# Patient Record
Sex: FEMALE | Race: BLACK OR AFRICAN AMERICAN | URBAN - METROPOLITAN AREA
[De-identification: names, ages, dates, MRNs, and addresses within clinical notes are randomized per-mention and may not be internally consistent; named-entity substitution may affect disease eponyms.]

---

## 2018-04-03 ENCOUNTER — EMERGENCY (EMERGENCY)
Facility: HOSPITAL | Age: 43
LOS: 0 days | Discharge: HOME | End: 2018-04-03
Attending: EMERGENCY MEDICINE

## 2018-04-03 VITALS
OXYGEN SATURATION: 100 % | HEART RATE: 75 BPM | RESPIRATION RATE: 18 BRPM | DIASTOLIC BLOOD PRESSURE: 65 MMHG | SYSTOLIC BLOOD PRESSURE: 113 MMHG | TEMPERATURE: 97 F

## 2018-04-03 DIAGNOSIS — R59.0 LOCALIZED ENLARGED LYMPH NODES: ICD-10-CM

## 2018-04-03 DIAGNOSIS — J02.9 ACUTE PHARYNGITIS, UNSPECIFIED: ICD-10-CM

## 2018-04-03 DIAGNOSIS — R07.0 PAIN IN THROAT: ICD-10-CM

## 2018-04-03 RX ORDER — DEXAMETHASONE 0.5 MG/5ML
10 ELIXIR ORAL ONCE
Qty: 0 | Refills: 0 | Status: COMPLETED | OUTPATIENT
Start: 2018-04-03 | End: 2018-04-03

## 2018-04-03 RX ORDER — IBUPROFEN 200 MG
600 TABLET ORAL ONCE
Qty: 0 | Refills: 0 | Status: COMPLETED | OUTPATIENT
Start: 2018-04-03 | End: 2018-04-03

## 2018-04-03 RX ADMIN — Medication 10 MILLIGRAM(S): at 12:26

## 2018-04-03 RX ADMIN — Medication 600 MILLIGRAM(S): at 12:27

## 2018-04-03 NOTE — ED PROVIDER NOTE - PHYSICAL EXAMINATION
CONSTITUTIONAL: Well-developed; well-nourished; in no acute distress.   ENT: +pharyngeal erythema, no exudates, no tonsillar enlargement; No nasal discharge; airway clear. TM's clear b/l  NECK: +lymphadenopathy on right side  CARD: S1, S2 normal; no murmurs, gallops, or rubs. Regular rate and rhythm.   RESP: No wheezes, rales or rhonchi.  NEURO: Alert, oriented, grossly unremarkable  PSYCH: Cooperative, appropriate.

## 2018-04-03 NOTE — ED PROVIDER NOTE - OBJECTIVE STATEMENT
43 y/o female with no pmhx presents with sore throat for 3 days. Patient states she gets this every year, associated with dysphagia to solids, denies fevers/chills, cough, n/v/d, abdominal pain, voice changes. Denies taking anything for the pain.

## 2018-04-03 NOTE — ED PROVIDER NOTE - NS ED ROS FT
Constitutional: See HPI.  ENMT: +throat pain; No hearing changes, pain, discharge or infections.   Cardiac: No chest pain, SOB or edema. No chest pain with exertion.  Respiratory: No cough or respiratory distress.   GI: No nausea, vomiting, diarrhea or abdominal pain.  : No dysuria, frequency or burning.  Neuro: No headache or weakness. No LOC.  Skin: No skin rash.

## 2018-04-03 NOTE — ED PROVIDER NOTE - PROGRESS NOTE DETAILS
ATTENDING NOTE:   43 y/o F presents to ED for evaluation of sore throat worsening x3 days. Significantly worse today so came to ED for evaluation. No difficulty breathing or swallowing. No fever/chills, cough/ congestion, nausea or vomiting. No other complaints.  On exam:  AVSS; exam as noted. MMM, (+)pharyngeal erythema but no exudates noted. (+)Anterior cervical lymphadenopathy b/l (+)TTP. CTAB. RRR.   Will give steroids, Motrin and discharge home with outpatient follow up. Supportive care and return precautions advised.

## 2018-04-03 NOTE — ED ADULT NURSE NOTE - CHPI ED SYMPTOMS NEG
no nausea/no weakness/no vomiting/no tingling/no pain/no chills/no decreased eating/drinking/no dizziness/no fever/no numbness

## 2018-10-29 ENCOUNTER — EMERGENCY (EMERGENCY)
Facility: HOSPITAL | Age: 43
LOS: 1 days | Discharge: HOME | End: 2018-10-29
Admitting: PHYSICIAN ASSISTANT

## 2018-10-29 VITALS
TEMPERATURE: 98 F | RESPIRATION RATE: 18 BRPM | HEART RATE: 80 BPM | SYSTOLIC BLOOD PRESSURE: 138 MMHG | DIASTOLIC BLOOD PRESSURE: 61 MMHG | OXYGEN SATURATION: 96 %
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
## 2018-10-29 DIAGNOSIS — S39.92XA UNSPECIFIED INJURY OF LOWER BACK, INITIAL ENCOUNTER: ICD-10-CM

## 2018-10-29 DIAGNOSIS — M54.5 LOW BACK PAIN: ICD-10-CM
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
## 2018-10-29 DIAGNOSIS — M25.572 PAIN IN LEFT ANKLE AND JOINTS OF LEFT FOOT: ICD-10-CM
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
## 2018-10-29 RX ORDER — IBUPROFEN 200 MG
600 TABLET ORAL ONCE
Qty: 0 | Refills: 0 | Status: COMPLETED | OUTPATIENT
Start: 2018-10-29 | End: 2018-10-29

## 2018-10-29 RX ADMIN — Medication 600 MILLIGRAM(S): at 20:00

## 2018-10-29 NOTE — ED PROVIDER NOTE - MUSCULOSKELETAL, MLM
+ left lumbar paravertebral tenderness without spinous process tenderness ; no obvious deformity to LLE; + mild tenderness along left lateral malleolous with full ROM exhibited to left ankle/foot/knee and hip; Spine appears normal, range of motion is not limited

## 2018-10-29 NOTE — ED PROVIDER NOTE - OBJECTIVE STATEMENT
44 y/o F, no significant PMHx, presents to the ED s/p mechanical fall this evening. Patient was helping her partner who just had surgery down the stairs when she tripped, injuring her left lower leg and left lower back. She denies any head trauma/LOC, gait abnormality, chest pain, dyspnea, abdominal pain and additional injuries. She states that she underwent left ankle surgery several years ago. She denies any blood thinner use.

## 2018-10-29 NOTE — ED ADULT TRIAGE NOTE - CHIEF COMPLAINT QUOTE
"I fell down the stairs and I hurt my left leg and back. I just want to get checked out." Pt denies head trauma, denies LOC.

## 2018-10-29 NOTE — ED PROVIDER NOTE - NSFOLLOWUPCLINICS_GEN_ALL_ED_FT
[facility]  Orthpedic  [address] [place], [place]   Phone: [phone]  Fax:   Follow Up Time:     [facility]  Rehabilitation  [address] [place] [address]  Phone: [phone]  Fax:   Follow Up Time:

## 2018-10-29 NOTE — ED ADULT NURSE NOTE - CHPI ED NUR SYMPTOMS NEG
no bleeding/no vomiting/no fever/no tingling/no loss of consciousness/no abrasion/no numbness/no confusion/no weakness/no deformity

## 2018-11-30 ENCOUNTER — EMERGENCY (EMERGENCY)
Facility: HOSPITAL | Age: 43
LOS: 0 days | Discharge: HOME | End: 2018-11-30
Admitting: PHYSICIAN ASSISTANT

## 2018-11-30 VITALS
SYSTOLIC BLOOD PRESSURE: 152 MMHG | OXYGEN SATURATION: 99 % | HEART RATE: 62 BPM | RESPIRATION RATE: 16 BRPM | TEMPERATURE: 97 F | DIASTOLIC BLOOD PRESSURE: 77 MMHG

## 2018-11-30 VITALS — HEIGHT: 66 IN | WEIGHT: 179.9 LBS

## 2018-11-30 DIAGNOSIS — L60.8 OTHER NAIL DISORDERS: ICD-10-CM

## 2018-11-30 DIAGNOSIS — M25.549 PAIN IN JOINTS OF UNSPECIFIED HAND: ICD-10-CM

## 2018-11-30 NOTE — ED PROVIDER NOTE - PHYSICAL EXAMINATION
VITAL SIGNS: I have reviewed nursing notes and confirm.  CONSTITUTIONAL: Well-developed; well-nourished; in no acute distress.  SKIN: +Mild darkening to around nailbed to right 2nd and 5th digit of right hand. No erythema, warmth, or abscess. No lymphangitis. Remainder of skin exam is warm and dry, no acute rash.  HEAD: Normocephalic; atraumatic.  EYES: PERRL, EOM intact; conjunctiva and sclera clear.  ENT: No nasal discharge; airway clear.   NECK: Supple; non tender.  CARD: S1, S2 normal; no murmurs, gallops, or rubs. Regular rate and rhythm.  RESP: Clear to auscultation bilaterally. No wheezes, rales or rhonchi.  ABD: Normal bowel sounds; soft; non-distended; non-tender.   EXT: Normal ROM. No edema.  LYMPH: No acute cervical adenopathy.  NEURO: Alert, oriented. Grossly unremarkable. No focal deficits.  PSYCH: Cooperative, appropriate.

## 2018-11-30 NOTE — ED PROVIDER NOTE - NSFOLLOWUPINSTRUCTIONS_ED_ALL_ED_FT
Return to the ED if your symptoms worse - you start have fever, chills, redness, swelling, drainage, bleeding, chest pain, shortness of breath, numbness, tingling, or weakness.

## 2018-11-30 NOTE — ED ADULT NURSE NOTE - NSIMPLEMENTINTERV_GEN_ALL_ED
Implemented All Universal Safety Interventions:  [place] to call system. Call bell, personal items and telephone within reach. Instruct patient to call for assistance. Room bathroom lighting operational. Non-slip footwear when patient is off stretcher. Physically safe environment: no spills, clutter or unnecessary equipment. Stretcher in lowest position, wheels locked, appropriate side rails in place.

## 2018-11-30 NOTE — ED PROVIDER NOTE - NSFOLLOWUPCLINICS_GEN_ALL_ED_FT
Oriented - self; Oriented - place; Oriented - time
A Family Medicine Doctor  Family Medicine  .  [place]   Phone:   Fax:   Follow Up Time: 1-3 Days

## 2018-11-30 NOTE — ED PROVIDER NOTE - CARE PROVIDER_API CALL
Brad Black), Dermatology; Internal Medicine  [address] [place] [address]  Phone: [phone]  Fax: [phone]

## 2018-12-18 PROBLEM — Z00.00 ENCOUNTER FOR PREVENTIVE HEALTH EXAMINATION: Status: ACTIVE | Noted: 2018-12-18

## 2019-04-02 ENCOUNTER — APPOINTMENT (OUTPATIENT)
Dept: DERMATOLOGY | Facility: CLINIC | Age: 44
End: 2019-04-02

## 2019-04-08 ENCOUNTER — EMERGENCY (EMERGENCY)
Facility: HOSPITAL | Age: 44
LOS: 0 days | Discharge: HOME | End: 2019-04-08
Admitting: PHYSICIAN ASSISTANT
Payer: MEDICAID

## 2019-04-08 VITALS
RESPIRATION RATE: 18 BRPM | SYSTOLIC BLOOD PRESSURE: 127 MMHG | OXYGEN SATURATION: 98 % | HEART RATE: 60 BPM | TEMPERATURE: 96 F | DIASTOLIC BLOOD PRESSURE: 81 MMHG

## 2019-04-08 DIAGNOSIS — Z79.899 OTHER LONG TERM (CURRENT) DRUG THERAPY: ICD-10-CM

## 2019-04-08 DIAGNOSIS — R21 RASH AND OTHER NONSPECIFIC SKIN ERUPTION: ICD-10-CM

## 2019-04-08 DIAGNOSIS — F17.200 NICOTINE DEPENDENCE, UNSPECIFIED, UNCOMPLICATED: ICD-10-CM

## 2019-04-08 DIAGNOSIS — Z79.2 LONG TERM (CURRENT) USE OF ANTIBIOTICS: ICD-10-CM

## 2019-04-08 PROCEDURE — 99283 EMERGENCY DEPT VISIT LOW MDM: CPT | Mod: 25

## 2019-04-08 RX ORDER — MUPIROCIN 20 MG/G
1 OINTMENT TOPICAL
Qty: 15 | Refills: 0 | OUTPATIENT
Start: 2019-04-08 | End: 2019-04-12

## 2019-04-08 RX ORDER — MUPIROCIN 20 MG/G
1 OINTMENT TOPICAL
Qty: 15 | Refills: 0 | OUTPATIENT
Start: 2019-04-08 | End: 2019-04-14

## 2019-04-08 RX ORDER — VALACYCLOVIR 500 MG/1
2 TABLET, FILM COATED ORAL
Qty: 4 | Refills: 0 | OUTPATIENT
Start: 2019-04-08 | End: 2019-04-08

## 2019-04-08 NOTE — ED PROVIDER NOTE - CLINICAL SUMMARY MEDICAL DECISION MAKING FREE TEXT BOX
42 yo F with no significant PMHx presents to the ED c/o rash below left nostril. Symptoms started yesterday and have been persisting. Pt concerned it might be herpes because she has hx of cold sores. Pt applied bleach to area and said it got rid of the blisters and itching. She denies other complaints. Pt denies fever, chills, sick contacts, new exposures (meds/creams/lotions/detergents). Exam shows small area of erythema with scab below left nostril. Will treat with Valtrex and Bactroban. Derm follow-up given. Return precautions given. Agreeable to d/c.

## 2019-04-08 NOTE — ED PROVIDER NOTE - CARE PROVIDER_API CALL
Brad Black)  Dermatology; Internal Medicine  [address] [place] [address]  Phone: [phone]  Fax: [phone]  Follow Up Time: 1-3 Days

## 2019-04-08 NOTE — ED PROVIDER NOTE - NS ED ROS FT
Review of Systems  Constitutional:  No fever, chills, malaise, generalized weakness.  Skin:  No jaundice, or lesions. (+) rash, pruritis   Endocrine: No history of thyroid disease or diabetes.

## 2019-04-08 NOTE — ED PROVIDER NOTE - PHYSICAL EXAMINATION
VITAL SIGNS: I have reviewed nursing notes and confirm.  CONSTITUTIONAL: Well-developed; well-nourished; in no acute distress.  SKIN: Skin exam is warm and dry. (+) small 1.5 cm area right below left nare of erythema and scab. Non-tender. No purulent drainage.   HEAD: Normocephalic; atraumatic.  EXT: Normal ROM.   NEURO: Alert, oriented. Grossly unremarkable. No focal deficits.

## 2019-04-08 NOTE — ED PROVIDER NOTE - OBJECTIVE STATEMENT
44 yo F with no significant PMHx presents to the ED c/o rash below left nostril. Symptoms started yesterday and have been persisting. Pt concerned it might be herpes because she has hx of cold sores. Pt applied bleach to area and said it got rid of the blisters and itching. She denies other complaints. Pt denies fever, chills, sick contacts, new exposures (meds/creams/lotions/detergents).

## 2019-04-28 ENCOUNTER — EMERGENCY (EMERGENCY)
Facility: HOSPITAL | Age: 44
LOS: 0 days | Discharge: HOME | End: 2019-04-28
Admitting: EMERGENCY MEDICINE
Payer: MEDICAID

## 2019-04-28 VITALS
DIASTOLIC BLOOD PRESSURE: 68 MMHG | HEART RATE: 65 BPM | TEMPERATURE: 97 F | SYSTOLIC BLOOD PRESSURE: 134 MMHG | OXYGEN SATURATION: 100 % | RESPIRATION RATE: 18 BRPM

## 2019-04-28 DIAGNOSIS — B00.9 HERPESVIRAL INFECTION, UNSPECIFIED: ICD-10-CM

## 2019-04-28 DIAGNOSIS — Z79.899 OTHER LONG TERM (CURRENT) DRUG THERAPY: ICD-10-CM

## 2019-04-28 DIAGNOSIS — R21 RASH AND OTHER NONSPECIFIC SKIN ERUPTION: ICD-10-CM

## 2019-04-28 DIAGNOSIS — F17.200 NICOTINE DEPENDENCE, UNSPECIFIED, UNCOMPLICATED: ICD-10-CM

## 2019-04-28 DIAGNOSIS — Z79.2 LONG TERM (CURRENT) USE OF ANTIBIOTICS: ICD-10-CM

## 2019-04-28 PROCEDURE — 99283 EMERGENCY DEPT VISIT LOW MDM: CPT

## 2019-04-28 RX ORDER — VALACYCLOVIR 500 MG/1
2 TABLET, FILM COATED ORAL
Qty: 4 | Refills: 0 | OUTPATIENT
Start: 2019-04-28 | End: 2019-04-28

## 2019-04-28 RX ORDER — MUPIROCIN 20 MG/G
1 OINTMENT TOPICAL
Qty: 15 | Refills: 0 | OUTPATIENT
Start: 2019-04-28 | End: 2019-05-04

## 2019-04-28 NOTE — ED PROVIDER NOTE - PHYSICAL EXAMINATION
CONST: Well appearing in NAD  EYES: PERRL, EOMI, Sclera and conjunctiva clear.   ENT: No nasal discharge. TM's clear B/L without drainage. Oropharynx normal appearing, no erythema or exudates. Uvula midline. There is a scabbed lesion to left uper lip and localized swelling of the lip. No abscess or cellulitis  NECK: Non-tender, no meningeal signs  SKIN: Warm, dry  NEURO: A&Ox3, No focal deficits. Strength 5/5 with no sensory deficits. Steady gait

## 2019-04-28 NOTE — ED PROVIDER NOTE - OBJECTIVE STATEMENT
Pt has c/o rash to upper lip x 1 day. Hx of cold sores. Symptoms began after applying steroid cream to a lesion that was just below the left nostril. DEnies fever.

## 2019-04-28 NOTE — ED ADULT NURSE NOTE - OBJECTIVE STATEMENT
Pt presents with sore on lip and nose.  Reports using a cream given to her by the dermatologist that made it worse.

## 2019-04-28 NOTE — ED ADULT NURSE NOTE - CHPI ED NUR SYMPTOMS NEG
no dizziness/no decreased eating/drinking/no fever/no nausea/no pain/no vomiting/no chills/no tingling/no weakness

## 2019-04-28 NOTE — ED PROVIDER NOTE - CLINICAL SUMMARY MEDICAL DECISION MAKING FREE TEXT BOX
Pt with herpes labialis. Will give Valtrex. Pt requesting bactroban for lesion below nose which has been present x 3 weeks. FU with [facility] clinic.  I have discussed the discharge plan with the patient. The patient agrees with the plan, as discussed.  The patient understands Emergency Department diagnosis is a preliminary diagnosis often based on limited information and that the patient must adhere to the follow-up plan as discussed.  The patient understands that if the symptoms worsen or if prescribed medications do not have the desired/planned effect that the patient may return to the Emergency Department at any time for further evaluation and treatment.

## 2019-04-28 NOTE — ED PROVIDER NOTE - NSFOLLOWUPCLINICS_GEN_ALL_ED_FT
[facility]  Medicine  [address] [place], [place]   Phone: [phone]  Fax:   Follow Up Time: 4-6 Days

## 2019-04-28 NOTE — ED ADULT NURSE NOTE - NSIMPLEMENTINTERV_GEN_ALL_ED
Implemented All Universal Safety Interventions:  [place] to call system. Call bell, personal items and telephone within reach. Instruct patient to call for assistance. Room bathroom lighting operational. Non-slip footwear when patient is off stretcher. Physically safe environment: no spills, clutter or unnecessary equipment. Stretcher in lowest position, wheels locked, appropriate side rails in place.

## 2019-10-02 ENCOUNTER — EMERGENCY (EMERGENCY)
Facility: HOSPITAL | Age: 44
LOS: 0 days | Discharge: HOME | End: 2019-10-02
Admitting: EMERGENCY MEDICINE
Payer: MEDICAID

## 2019-10-02 VITALS
HEART RATE: 65 BPM | RESPIRATION RATE: 18 BRPM | TEMPERATURE: 97 F | OXYGEN SATURATION: 99 % | DIASTOLIC BLOOD PRESSURE: 63 MMHG | SYSTOLIC BLOOD PRESSURE: 143 MMHG

## 2019-10-02 DIAGNOSIS — M54.5 LOW BACK PAIN: ICD-10-CM

## 2019-10-02 DIAGNOSIS — M54.9 DORSALGIA, UNSPECIFIED: ICD-10-CM

## 2019-10-02 DIAGNOSIS — R32 UNSPECIFIED URINARY INCONTINENCE: ICD-10-CM

## 2019-10-02 PROCEDURE — 99283 EMERGENCY DEPT VISIT LOW MDM: CPT

## 2019-10-02 RX ORDER — METHOCARBAMOL 500 MG/1
750 TABLET, FILM COATED ORAL ONCE
Refills: 0 | Status: COMPLETED | OUTPATIENT
Start: 2019-10-02 | End: 2019-10-02

## 2019-10-02 RX ORDER — KETOROLAC TROMETHAMINE 30 MG/ML
15 SYRINGE (ML) INJECTION ONCE
Refills: 0 | Status: DISCONTINUED | OUTPATIENT
Start: 2019-10-02 | End: 2019-10-02

## 2019-10-02 RX ORDER — METHOCARBAMOL 500 MG/1
1 TABLET, FILM COATED ORAL
Qty: 21 | Refills: 0
Start: 2019-10-02 | End: 2019-10-08

## 2019-10-02 RX ADMIN — Medication 15 MILLIGRAM(S): at 22:53

## 2019-10-02 RX ADMIN — METHOCARBAMOL 750 MILLIGRAM(S): 500 TABLET, FILM COATED ORAL at 22:53

## 2019-10-02 NOTE — ED PROVIDER NOTE - PHYSICAL EXAMINATION
Physical Exam    Vital Signs: I have reviewed the initial vital signs.  Constitutional: well-nourished, appears stated age, no acute distress  Eyes: Conjunctiva pink, Sclera clear,   Cardiovascular: S1 and S2, regular rate, regular rhythm, well-perfused extremities, radial pulses equal and 2+, pedal pulses 2+ and equal   Respiratory: unlabored respiratory effort, clear to auscultation bilaterally no wheezing, rales and rhonchi  Musculoskeletal: supple neck, no lower extremity edema, no midline tenderness, from of neck, ttp of lumbar right paraspinal ttp.   Integumentary: warm, dry, no rash  Neurologic: awake, alert, cranial nerves II-XII grossly intact, extremities’ motor and sensory functions grossly intact, normal gait.

## 2019-10-02 NOTE — ED PROVIDER NOTE - NSFOLLOWUPCLINICS_GEN_ALL_ED_FT
[facility] ([facility])  Rehabilitation  [facility] [address] [place] [address]  Phone: [phone]  Fax:   Follow Up Time: 1-3 Days

## 2019-10-02 NOTE — ED PROVIDER NOTE - CARE PROVIDER_API CALL
Vikas Esposito (MD)  Orthopaedic Surgery  [address] [place] [address]  Phone: [phone]  Fax: [phone]  Follow Up Time: 1-3 Days

## 2019-10-02 NOTE — ED PROVIDER NOTE - OBJECTIVE STATEMENT
43 yo female, no pmh, presents to ed for back pain. present for 6 months, lower back, no radiation, described as aching, min better with otc meds. denies fever, chills, ivda, back surgeries, saddle anesthesia, incontinence, numbness, tingling.

## 2019-10-02 NOTE — ED PROVIDER NOTE - NS ED ROS FT
Constitutional: (-) fever, (-) chills  Eyes: (-) visual changes  ENT: (-) nasal congestions  Cardiovascular: (-) chest pain, (-) syncope  Respiratory: (-) cough, (-) shortness of breath, (-) dyspnea,   Gastrointestinal: (-) vomiting, (-) diarrhea, (-)nausea,  Musculoskeletal: (-) neck pain, (+) back pain, (-) joint pain,  Integumentary: (-) rash, (-) edema, (-) bruises  Neurological: (-) headache, (-) loc, (-) dizziness, (-) tingling, (-)numbness,  Peripheral Vascular: (-) leg swelling  :  (-)dysuria,  (-) hematuria, (+) incontinence   Allergic/Immunologic: (-) pruritus

## 2019-10-02 NOTE — ED PROVIDER NOTE - PATIENT PORTAL LINK FT
You can access the FollowMyHealth Patient Portal offered by [facility] by registering at the following website: [email or web address]. By joining [employer]’s FollowMyHealth portal, you will also be able to view your health information using other applications (apps) compatible with our system.

## 2019-12-16 NOTE — ED PROVIDER NOTE - NS_EDPROVIDERDISPOUSERTYPE_ED_A_ED
Scribe Attestation (For Scribes USE Only)... Scribe Attestation (For Scribes USE Only).../Attending Attestation (For Attendings USE Only)... +braces to upper and lower teeth

## 2019-12-31 NOTE — ED PROVIDER NOTE - DR. NAME
· Med Name & Dose: atorvastatin 20mg  · Last refill was 12/3/19 Number of Refills sent: 0  · Quantity of medication given 30 day supply  · Last visit for that condition 12/23/19  · Date of next appt: Visit date not found  · Date of any Lab results pertaining to medication: 12/20/19  Refilled per protocol: Yes    Med Name & Dose: losartan 50mg  Last refill was 12/3/19 Number of Refills sent: 0  Quantity of medication given 30 day supply  Last visit for that condition 12/23/19  Date of next appt: Visit date not found  Date of any Lab results pertaining to medication: 12/20/19 refill per protocol            ·    IHSAN CHOPRA

## 2020-09-16 ENCOUNTER — EMERGENCY (EMERGENCY)
Facility: HOSPITAL | Age: 45
LOS: 0 days | Discharge: HOME | End: 2020-09-16
Attending: EMERGENCY MEDICINE | Admitting: EMERGENCY MEDICINE
Payer: MEDICAID

## 2020-09-16 VITALS
OXYGEN SATURATION: 100 % | DIASTOLIC BLOOD PRESSURE: 75 MMHG | SYSTOLIC BLOOD PRESSURE: 154 MMHG | TEMPERATURE: 98 F | HEART RATE: 64 BPM | WEIGHT: 175.93 LBS | HEIGHT: 66 IN | RESPIRATION RATE: 18 BRPM

## 2020-09-16 DIAGNOSIS — F17.200 NICOTINE DEPENDENCE, UNSPECIFIED, UNCOMPLICATED: ICD-10-CM

## 2020-09-16 DIAGNOSIS — J06.9 ACUTE UPPER RESPIRATORY INFECTION, UNSPECIFIED: ICD-10-CM

## 2020-09-16 DIAGNOSIS — Z20.828 CONTACT WITH AND (SUSPECTED) EXPOSURE TO OTHER VIRAL COMMUNICABLE DISEASES: ICD-10-CM

## 2020-09-16 DIAGNOSIS — R05 COUGH: ICD-10-CM

## 2020-09-16 PROCEDURE — 71045 X-RAY EXAM CHEST 1 VIEW: CPT | Mod: 26

## 2020-09-16 PROCEDURE — 99284 EMERGENCY DEPT VISIT MOD MDM: CPT

## 2020-09-16 NOTE — ED PROVIDER NOTE - OBJECTIVE STATEMENT
44yo F no significant past medical history presenting with cough fatigue myalgias congestion rhinorrhea for the past few days. per pt, no recent sick contacts but works at [employer]. no chest pain. No dyspnea on exertion, orthopnea, weight gain, lower extremity edema. No history DVT/PE, no lower extremity swelling/pain, no recent travel/trauma, no exogenous hormone use, no known active malignancy. gradual onset mild no exac/relieving sx

## 2020-09-16 NOTE — ED PROVIDER NOTE - CLINICAL SUMMARY MEDICAL DECISION MAKING FREE TEXT BOX
44yo F no significant past medical history presenting with cough fatigue myalgias congestion rhinorrhea for the past few days. per pt, no recent sick contacts but works at [employer]. no chest pain. No dyspnea on exertion, orthopnea, weight gain, lower extremity edema. No history DVT/PE, no lower extremity swelling/pain, no recent travel/trauma, no exogenous hormone use, no known active malignancy. gradual onset mild no exac/relieving sx. likely viral uri. Comfortable with discharge and follow-up outpatient, strict return precautions given. Endorses understanding of all of this and aware that they can return at any time for new or concerning symptoms. No further questions or concerns at this time

## 2020-09-16 NOTE — ED PROVIDER NOTE - NS ED ROS FT
Constitutional:  See HPI.   Eyes:  No visual changes, eye pain or discharge.  ENMT:  No hearing changes, pain, discharge or infections. No neck pain or stiffness.  Cardiac:  No chest pain   Respiratory:   No hemoptysis.  GI:  No nausea, vomiting, diarrhea, abdominal pain.  :  No dysuria, frequency, hematuria  MS:  No joint pain or back pain.  Neuro:  No LOC. No headache or weakness.    Skin:  No skin rash.  Except as in HPI, all other review of systems is negative

## 2020-09-16 NOTE — ED PROVIDER NOTE - PATIENT PORTAL LINK FT
You can access the FollowMyHealth Patient Portal offered by [facility] by registering at the following website: [email or web address]. By joining [employer]’s FollowMyHealth portal, you will also be able to view your health information using other applications (apps) compatible with our system.

## 2020-09-17 LAB — SARS-COV-2 RNA SPEC QL NAA+PROBE: SIGNIFICANT CHANGE UP

## 2020-10-23 ENCOUNTER — EMERGENCY (EMERGENCY)
Facility: HOSPITAL | Age: 45
LOS: 0 days | Discharge: HOME | End: 2020-10-24
Attending: STUDENT IN AN ORGANIZED HEALTH CARE EDUCATION/TRAINING PROGRAM | Admitting: STUDENT IN AN ORGANIZED HEALTH CARE EDUCATION/TRAINING PROGRAM
Payer: MEDICAID

## 2020-10-23 VITALS
WEIGHT: 160.06 LBS | SYSTOLIC BLOOD PRESSURE: 130 MMHG | TEMPERATURE: 99 F | OXYGEN SATURATION: 100 % | DIASTOLIC BLOOD PRESSURE: 92 MMHG | HEART RATE: 88 BPM | HEIGHT: 66 IN | RESPIRATION RATE: 16 BRPM

## 2020-10-23 DIAGNOSIS — K13.79 OTHER LESIONS OF ORAL MUCOSA: ICD-10-CM

## 2020-10-23 DIAGNOSIS — F17.200 NICOTINE DEPENDENCE, UNSPECIFIED, UNCOMPLICATED: ICD-10-CM

## 2020-10-23 DIAGNOSIS — K12.0 RECURRENT ORAL APHTHAE: ICD-10-CM

## 2020-10-23 PROCEDURE — 99282 EMERGENCY DEPT VISIT SF MDM: CPT

## 2020-10-24 NOTE — ED PROVIDER NOTE - PROGRESS NOTE DETAILS
Strict return precautions discussed with patient. Instructed to follow up with dentist. Asking for dermatology referral for acne. -DC

## 2020-10-24 NOTE — ED PROVIDER NOTE - NSFOLLOWUPCLINICS_GEN_ALL_ED_FT
[facility]  Dental  [address] [place] [id number]  Phone: [phone]  Fax:   Follow Up Time:

## 2020-10-24 NOTE — ED PROVIDER NOTE - PATIENT PORTAL LINK FT
You can access the FollowMyHealth Patient Portal offered by [facility] by registering at the following website: [email or web address]. By joining [employer]’s FollowMyHealth portal, you will also be able to view your health information using other applications (apps) compatible with our system.

## 2020-10-24 NOTE — ED PROVIDER NOTE - NS ED ROS FT
Constitutional: No fevers.   Eyes:  No visual changes, eye pain or discharge.  ENMT:  +mouth sores.   Cardiac:  No chest pain, SOB or edema.   Respiratory:  No cough or respiratory distress. No hemoptysis. No history of asthma or RAD.  GI:  No nausea, vomiting, diarrhea or abdominal pain.  :  No dysuria, frequency or burning.  MS:  No myalgia, muscle weakness, joint pain or back pain.  Neuro:  No headache or weakness.  No LOC.  Skin:  No skin rash.   Endocrine: No history of thyroid disease or diabetes.

## 2020-10-24 NOTE — ED PROVIDER NOTE - CARE PROVIDER_API CALL
Brad Black)  Dermatology; Internal Medicine  [address] [place] [address]  Phone: [phone]  Fax: [phone]  Follow Up Time:

## 2020-10-24 NOTE — ED PROVIDER NOTE - ATTENDING CONTRIBUTION TO CARE
46 yo F w/ hx of oral herpes, dentures w/ breakouts on lips usually p/w mouth sores. pt endorses salty taste in mouth for several days. pt has been using Listerine and peroxide w/ some improvement of sx. pt came for eval because sx have not resolved. no difficulty breathing/swallowing.     vss  gen- NAD, aaox3  HENT- small ulcerataive lesion to lower lip buccal mucosa, mild tenderness to L lower gum ginviga w/o abscess  card-rrr  lungs-ctab, no wheezing or rhonchi    apthous stomatitis  will rec continued mouth rinses, dental f/u

## 2020-10-24 NOTE — ED PROVIDER NOTE - PHYSICAL EXAMINATION
CONSTITUTIONAL: Well-developed; well-nourished; in no acute distress.   SKIN: warm, dry.  HEAD: Normocephalic; atraumatic.  EYES: EOMI, no conjunctival erythema.  ENMT: punctate sore to the left frontal gum, left lower gum near tooth 17; nonedematous posterior oropharynx.   NECK: Supple; non tender.  NEURO: Alert, oriented, grossly unremarkable.  PSYCH: Cooperative, appropriate.

## 2020-10-24 NOTE — ED PROVIDER NOTE - NSFOLLOWUPINSTRUCTIONS_ED_ALL_ED_FT
Stomatitis    Stomatitis is a condition that causes swelling (inflammation) in your mouth. It can affect a part of your mouth or your whole mouth. The condition often affects your cheek, teeth, gums, lips, and tongue. Stomatitis can also affect the mucous membranes that surround your mouth (mucosa).    Pain from stomatitis can make it hard for you to eat or drink. Very bad cases of this condition can lead to not getting enough fluid in your body (dehydration) or poor nutrition.    Follow these instructions at home:  Medicines     Take medicines only as told by your doctor.  If you were prescribed an antibiotic, finish all of it even if you start to feel better.  Lifestyle     Take good care of your mouth and teeth (oral hygiene):    Gently brush your teeth with a soft, nylon-bristled toothbrush two times each day.  Floss your teeth every day.  Have your teeth cleaned regularly. Do this as told by your dentist.    Eat a balanced diet. Do not eat:    Spicy foods.  Citrus, such as oranges.  Foods that have sharp edges, such as chips.    Avoid any foods or other things that you think may be causing this condition.  If you have dentures, make sure that they fit the way that they should.  Do not use any tobacco products, including cigarettes, chewing tobacco, or electronic cigarettes. If you need help quitting, ask your doctor.  Find ways to lower your stress. Try yoga or meditation. Ask your doctor for other ideas.  General instructions     Use a salt–water rinse for pain as told by your doctor. Mix 1 tsp of salt in 2 cups of water.  Drink enough fluid to keep your pee (urine) clear or pale yellow. This will keep you hydrated.  Contact a doctor if:  Your symptoms get worse.  You develop new symptoms, especially:    A rash.  New symptoms that do not involve your mouth area.    Your symptoms last longer than three weeks.  Your stomatitis goes away and then comes back.  You have a harder time eating and drinking normally.  You are more tired.  You feel weaker.  You stop feeling hungry.  You feel sick to your stomach (nauseous).  You have a fever.  This information is not intended to replace advice given to you by your health care provider. Make sure you discuss any questions you have with your health care provider.

## 2020-12-09 ENCOUNTER — EMERGENCY (EMERGENCY)
Facility: HOSPITAL | Age: 45
LOS: 0 days | Discharge: HOME | End: 2020-12-09
Attending: EMERGENCY MEDICINE | Admitting: EMERGENCY MEDICINE
Payer: MEDICAID

## 2020-12-09 VITALS
OXYGEN SATURATION: 100 % | HEIGHT: 66 IN | TEMPERATURE: 98 F | RESPIRATION RATE: 29 BRPM | HEART RATE: 67 BPM | SYSTOLIC BLOOD PRESSURE: 131 MMHG | DIASTOLIC BLOOD PRESSURE: 60 MMHG

## 2020-12-09 DIAGNOSIS — K08.89 OTHER SPECIFIED DISORDERS OF TEETH AND SUPPORTING STRUCTURES: ICD-10-CM

## 2020-12-09 PROCEDURE — 99283 EMERGENCY DEPT VISIT LOW MDM: CPT

## 2020-12-09 RX ORDER — AMOXICILLIN 250 MG/5ML
1 SUSPENSION, RECONSTITUTED, ORAL (ML) ORAL
Qty: 21 | Refills: 0
Start: 2020-12-09 | End: 2020-12-15

## 2020-12-09 RX ORDER — IBUPROFEN 200 MG
1 TABLET ORAL
Qty: 9 | Refills: 0
Start: 2020-12-09 | End: 2020-12-11

## 2020-12-09 NOTE — ED ADULT NURSE NOTE - NSIMPLEMENTINTERV_GEN_ALL_ED
Implemented All Universal Safety Interventions:  [place] to call system. Call bell, personal items and telephone within reach. Instruct patient to call for assistance. Room bathroom lighting operational. Non-slip footwear when patient is off stretcher. Physically safe environment: no spills, clutter or unnecessary equipment. Stretcher in lowest position, wheels locked, appropriate side rails in place.

## 2020-12-09 NOTE — ED PROVIDER NOTE - OBJECTIVE STATEMENT
46 yo F with no pmhx presenting with throbbing, left upper dental pain. States she had dental work done 6 months ago and feel that her teeth are now breaking. Reports mild headache. Has been using listerine. No cp, sob, fever, chills, abdominal pain, nausea, vomiting, diarrhea, back pain, urinary symptoms, dizziness, paresthesias, or weakness.

## 2020-12-09 NOTE — ED PROVIDER NOTE - ATTENDING CONTRIBUTION TO CARE
dental pain. no fever.  gingivitis under bridge. no abscess. will give amox, otc supportive care. outpt dds.

## 2020-12-09 NOTE — ED PROVIDER NOTE - NS ED ROS FT
Review of Systems:  	•	CONSTITUTIONAL - no fever, no diaphoresis, no chills  	•	SKIN - no rash  	•	HEMATOLOGIC - no bleeding, no bruising  	•	EYES - no eye pain, no blurry vision  	•	ENT - +dentalgia, no congestion  	•	RESPIRATORY - no shortness of breath, no cough  	•	CARDIAC - no chest pain, no palpitations  	•	GI - no abd pain, no nausea, no vomiting, no diarrhea, no constipation  	•	GENITO-URINARY - no dysuria; no hematuria, no increased urinary frequency  	•	MUSCULOSKELETAL - no joint paint, no swelling, no redness  	•	NEUROLOGIC - no weakness, + headache, no paresthesias, no LOC  	•	PSYCH - no anxiety, no depression  	All other ROS are negative except as documented in HPI.

## 2020-12-09 NOTE — ED ADULT TRIAGE NOTE - CHIEF COMPLAINT QUOTE
Patient presents to ED c/o severe dental pain on the right side of mouth worsening over the past month. Patient states that she had a procedure done 6 months ago and notes pain associated with brown discoloration of teeth

## 2020-12-09 NOTE — ED PROVIDER NOTE - PATIENT PORTAL LINK FT
You can access the FollowMyHealth Patient Portal offered by  by registering at the following website: [email or web address]. By joining [employer]’s FollowMyHealth portal, you will also be able to view your health information using other applications (apps) compatible with our system.

## 2020-12-09 NOTE — ED PROVIDER NOTE - PHYSICAL EXAMINATION
VITAL SIGNS: I have reviewed nursing notes and confirm.  CONSTITUTIONAL: Well-developed; well-nourished; in no acute distress.  SKIN: Skin exam is warm and dry, no acute rash.  HEAD: Normocephalic; atraumatic.  EYES: PERRL, EOM intact; conjunctiva and sclera clear.  ENT: +Gingivitis over dental bridge to left upper dental region. No abscess. No nasal discharge; airway clear.   NECK: Supple; non tender.  CARD: S1, S2 normal; no murmurs, gallops, or rubs. Regular rate and rhythm.  RESP: Clear to auscultation bilaterally. No wheezes, rales or rhonchi.  ABD: Normal bowel sounds; soft; non-distended; non-tender.   EXT: Normal ROM. No edema.  LYMPH: No acute cervical adenopathy.  NEURO: Alert, oriented. Grossly unremarkable. No focal deficits.  PSYCH: Cooperative, appropriate.

## 2021-03-11 NOTE — ED PROVIDER NOTE - OBJECTIVE STATEMENT
I was the attending physician on duty at the time the patient visited the emergency department  The patient was evaluated and dispositioned by the APC  I was personally available for consultation  I am administratively signing the chart after the fact      Esther Collins MD 42 yo F with no pmhx presenting with c/o "I want to make sure I don't have gas gangrene". Patient states that she changed her 's dressing on his foot whom has gas gangrene and is concerned she might have it because she noted some discoloration to around nailbed to right 2nd and 5th digit of hand x 4 days. No cp, sob, fever, chills, abdominal pain, nausea, vomiting, diarrhea, back pain, urinary symptoms, headache, dizziness,  paresthesias, or weakness. No swelling, erythema, warmth, or abscess. Patient is not a diabetic. No open wounds. Denies any recent trauma. No recent travel, no recent surgery, no leg swelling, calf pain, hemoptysis, hormonal use, or history of blood clots.

## 2021-04-12 NOTE — ED PROVIDER NOTE - NS ED ROS FT
Review of Systems:  	•	CONSTITUTIONAL - no fever, no diaphoresis, no chills  	•	SKIN - +skin discoloration, no rash  	•	HEMATOLOGIC - no bleeding, no bruising  	•	EYES - no eye pain, no blurry vision  	•	ENT - no congestion  	•	RESPIRATORY - no shortness of breath, no cough  	•	CARDIAC - no chest pain, no palpitations  	•	GI - no abd pain, no nausea, no vomiting, no diarrhea, no constipation  	•	GENITO-URINARY - no dysuria; no hematuria, no increased urinary frequency  	•	MUSCULOSKELETAL - no joint paint, no swelling, no redness  	•	NEUROLOGIC - no weakness, no headache, no paresthesias, no LOC  	All other ROS are negative except as documented in HPI. [Consultation] : a consultation visit [Patient] : patient [Mother] : mother

## 2021-12-12 ENCOUNTER — INPATIENT (INPATIENT)
Facility: HOSPITAL | Age: 46
LOS: 2 days | Discharge: HOME | End: 2021-12-15
Attending: INTERNAL MEDICINE | Admitting: INTERNAL MEDICINE
Payer: MEDICAID

## 2021-12-12 VITALS
SYSTOLIC BLOOD PRESSURE: 136 MMHG | OXYGEN SATURATION: 99 % | RESPIRATION RATE: 19 BRPM | DIASTOLIC BLOOD PRESSURE: 75 MMHG | WEIGHT: 110.01 LBS | HEART RATE: 69 BPM

## 2021-12-12 LAB
ALBUMIN SERPL ELPH-MCNC: 3.7 G/DL — SIGNIFICANT CHANGE UP (ref 3.5–5.2)
ALBUMIN SERPL ELPH-MCNC: 4.2 G/DL — SIGNIFICANT CHANGE UP (ref 3.5–5.2)
ALP SERPL-CCNC: 62 U/L — SIGNIFICANT CHANGE UP (ref 30–115)
ALP SERPL-CCNC: 70 U/L — SIGNIFICANT CHANGE UP (ref 30–115)
ALT FLD-CCNC: 45 U/L — HIGH (ref 0–41)
ALT FLD-CCNC: 50 U/L — HIGH (ref 0–41)
ANION GAP SERPL CALC-SCNC: 13 MMOL/L — SIGNIFICANT CHANGE UP (ref 7–14)
ANION GAP SERPL CALC-SCNC: 22 MMOL/L — HIGH (ref 7–14)
APAP SERPL-MCNC: <5 UG/ML — LOW (ref 10–30)
APPEARANCE UR: CLEAR — SIGNIFICANT CHANGE UP
AST SERPL-CCNC: 109 U/L — HIGH (ref 0–41)
AST SERPL-CCNC: 54 U/L — HIGH (ref 0–41)
B-OH-BUTYR SERPL-SCNC: 0.6 MMOL/L — HIGH
BASE EXCESS BLDV CALC-SCNC: -7.2 MMOL/L — LOW (ref -2–3)
BASE EXCESS BLDV CALC-SCNC: -9.1 MMOL/L — LOW (ref -2–3)
BASOPHILS # BLD AUTO: 0.08 K/UL — SIGNIFICANT CHANGE UP (ref 0–0.2)
BASOPHILS NFR BLD AUTO: 0.6 % — SIGNIFICANT CHANGE UP (ref 0–1)
BILIRUB DIRECT SERPL-MCNC: 0.2 MG/DL — SIGNIFICANT CHANGE UP (ref 0–0.3)
BILIRUB INDIRECT FLD-MCNC: 0.4 MG/DL — SIGNIFICANT CHANGE UP (ref 0.2–1.2)
BILIRUB SERPL-MCNC: 0.6 MG/DL — SIGNIFICANT CHANGE UP (ref 0.2–1.2)
BILIRUB SERPL-MCNC: 0.8 MG/DL — SIGNIFICANT CHANGE UP (ref 0.2–1.2)
BILIRUB UR-MCNC: NEGATIVE — SIGNIFICANT CHANGE UP
BUN SERPL-MCNC: 9 MG/DL — LOW (ref 10–20)
BUN SERPL-MCNC: 9 MG/DL — LOW (ref 10–20)
CA-I SERPL-SCNC: 1.14 MMOL/L — LOW (ref 1.15–1.33)
CA-I SERPL-SCNC: 1.16 MMOL/L — SIGNIFICANT CHANGE UP (ref 1.15–1.33)
CALCIUM SERPL-MCNC: 8.6 MG/DL — SIGNIFICANT CHANGE UP (ref 8.5–10.1)
CALCIUM SERPL-MCNC: 8.8 MG/DL — SIGNIFICANT CHANGE UP (ref 8.5–10.1)
CHLORIDE SERPL-SCNC: 103 MMOL/L — SIGNIFICANT CHANGE UP (ref 98–110)
CHLORIDE SERPL-SCNC: 103 MMOL/L — SIGNIFICANT CHANGE UP (ref 98–110)
CK SERPL-CCNC: 233 U/L — HIGH (ref 0–225)
CO2 SERPL-SCNC: 12 MMOL/L — LOW (ref 17–32)
CO2 SERPL-SCNC: 20 MMOL/L — SIGNIFICANT CHANGE UP (ref 17–32)
COLOR SPEC: SIGNIFICANT CHANGE UP
CREAT SERPL-MCNC: 0.6 MG/DL — LOW (ref 0.7–1.5)
CREAT SERPL-MCNC: 0.7 MG/DL — SIGNIFICANT CHANGE UP (ref 0.7–1.5)
DIFF PNL FLD: SIGNIFICANT CHANGE UP
EOSINOPHIL # BLD AUTO: 0.05 K/UL — SIGNIFICANT CHANGE UP (ref 0–0.7)
EOSINOPHIL NFR BLD AUTO: 0.4 % — SIGNIFICANT CHANGE UP (ref 0–8)
ETHANOL SERPL-MCNC: 99 MG/DL — HIGH
FLUAV AG NPH QL: NEGATIVE — SIGNIFICANT CHANGE UP
FLUBV AG NPH QL: NEGATIVE — SIGNIFICANT CHANGE UP
GAS PNL BLDV: 135 MMOL/L — LOW (ref 136–145)
GAS PNL BLDV: 138 MMOL/L — SIGNIFICANT CHANGE UP (ref 136–145)
GAS PNL BLDV: SIGNIFICANT CHANGE UP
GAS PNL BLDV: SIGNIFICANT CHANGE UP
GLUCOSE BLDC GLUCOMTR-MCNC: 104 MG/DL — HIGH (ref 70–99)
GLUCOSE BLDC GLUCOMTR-MCNC: 119 MG/DL — HIGH (ref 70–99)
GLUCOSE BLDC GLUCOMTR-MCNC: 128 MG/DL — HIGH (ref 70–99)
GLUCOSE SERPL-MCNC: 29 MG/DL — CRITICAL LOW (ref 70–99)
GLUCOSE SERPL-MCNC: 97 MG/DL — SIGNIFICANT CHANGE UP (ref 70–99)
GLUCOSE UR QL: ABNORMAL
HCO3 BLDV-SCNC: 19 MMOL/L — LOW (ref 22–29)
HCO3 BLDV-SCNC: 20 MMOL/L — LOW (ref 22–29)
HCT VFR BLD CALC: 38.8 % — SIGNIFICANT CHANGE UP (ref 37–47)
HCT VFR BLDA CALC: 33 % — LOW (ref 34.5–46.5)
HCT VFR BLDA CALC: 37 % — SIGNIFICANT CHANGE UP (ref 34.5–46.5)
HGB BLD CALC-MCNC: 11.1 G/DL — LOW (ref 11.7–16.1)
HGB BLD CALC-MCNC: 12.3 G/DL — SIGNIFICANT CHANGE UP (ref 11.7–16.1)
HGB BLD-MCNC: 12.3 G/DL — SIGNIFICANT CHANGE UP (ref 12–16)
IMM GRANULOCYTES NFR BLD AUTO: 1.1 % — HIGH (ref 0.1–0.3)
KETONES UR-MCNC: ABNORMAL
LACTATE BLDV-MCNC: 3.9 MMOL/L — HIGH (ref 0.5–2)
LACTATE BLDV-MCNC: 4.5 MMOL/L — CRITICAL HIGH (ref 0.5–2)
LEUKOCYTE ESTERASE UR-ACNC: NEGATIVE — SIGNIFICANT CHANGE UP
LYMPHOCYTES # BLD AUTO: 1.35 K/UL — SIGNIFICANT CHANGE UP (ref 1.2–3.4)
LYMPHOCYTES # BLD AUTO: 9.5 % — LOW (ref 20.5–51.1)
MAGNESIUM SERPL-MCNC: 1.6 MG/DL — LOW (ref 1.8–2.4)
MCHC RBC-ENTMCNC: 30.4 PG — SIGNIFICANT CHANGE UP (ref 27–31)
MCHC RBC-ENTMCNC: 31.7 G/DL — LOW (ref 32–37)
MCV RBC AUTO: 96 FL — SIGNIFICANT CHANGE UP (ref 81–99)
MONOCYTES # BLD AUTO: 0.74 K/UL — HIGH (ref 0.1–0.6)
MONOCYTES NFR BLD AUTO: 5.2 % — SIGNIFICANT CHANGE UP (ref 1.7–9.3)
NEUTROPHILS # BLD AUTO: 11.76 K/UL — HIGH (ref 1.4–6.5)
NEUTROPHILS NFR BLD AUTO: 83.2 % — HIGH (ref 42.2–75.2)
NITRITE UR-MCNC: NEGATIVE — SIGNIFICANT CHANGE UP
NRBC # BLD: 0 /100 WBCS — SIGNIFICANT CHANGE UP (ref 0–0)
OSMOLALITY SERPL: 304 MOS/KG — HIGH (ref 275–300)
PCO2 BLDV: 50 MMHG — HIGH (ref 39–42)
PCO2 BLDV: 50 MMHG — HIGH (ref 39–42)
PH BLDV: 7.19 — LOW (ref 7.32–7.43)
PH BLDV: 7.22 — LOW (ref 7.32–7.43)
PH UR: 5.5 — SIGNIFICANT CHANGE UP (ref 5–8)
PHOSPHATE SERPL-MCNC: 3.9 MG/DL — SIGNIFICANT CHANGE UP (ref 2.1–4.9)
PLATELET # BLD AUTO: 312 K/UL — SIGNIFICANT CHANGE UP (ref 130–400)
PO2 BLDV: 33 MMHG — SIGNIFICANT CHANGE UP
PO2 BLDV: 37 MMHG — SIGNIFICANT CHANGE UP
POTASSIUM BLDV-SCNC: 3.2 MMOL/L — LOW (ref 3.5–5.1)
POTASSIUM BLDV-SCNC: 3.5 MMOL/L — SIGNIFICANT CHANGE UP (ref 3.5–5.1)
POTASSIUM SERPL-MCNC: 4.1 MMOL/L — SIGNIFICANT CHANGE UP (ref 3.5–5)
POTASSIUM SERPL-MCNC: 4.5 MMOL/L — SIGNIFICANT CHANGE UP (ref 3.5–5)
POTASSIUM SERPL-SCNC: 4.1 MMOL/L — SIGNIFICANT CHANGE UP (ref 3.5–5)
POTASSIUM SERPL-SCNC: 4.5 MMOL/L — SIGNIFICANT CHANGE UP (ref 3.5–5)
PROT SERPL-MCNC: 6.4 G/DL — SIGNIFICANT CHANGE UP (ref 6–8)
PROT SERPL-MCNC: 7.7 G/DL — SIGNIFICANT CHANGE UP (ref 6–8)
PROT UR-MCNC: SIGNIFICANT CHANGE UP
RBC # BLD: 4.04 M/UL — LOW (ref 4.2–5.4)
RBC # FLD: 12.8 % — SIGNIFICANT CHANGE UP (ref 11.5–14.5)
RSV RNA NPH QL NAA+NON-PROBE: NEGATIVE — SIGNIFICANT CHANGE UP
SALICYLATES SERPL-MCNC: 0.4 MG/DL — LOW (ref 4–30)
SAO2 % BLDV: 46.5 % — SIGNIFICANT CHANGE UP
SAO2 % BLDV: 58.6 % — SIGNIFICANT CHANGE UP
SARS-COV-2 RNA SPEC QL NAA+PROBE: NEGATIVE — SIGNIFICANT CHANGE UP
SODIUM SERPL-SCNC: 136 MMOL/L — SIGNIFICANT CHANGE UP (ref 135–146)
SODIUM SERPL-SCNC: 137 MMOL/L — SIGNIFICANT CHANGE UP (ref 135–146)
SP GR SPEC: 1.01 — SIGNIFICANT CHANGE UP (ref 1.01–1.03)
UROBILINOGEN FLD QL: SIGNIFICANT CHANGE UP
WBC # BLD: 14.14 K/UL — HIGH (ref 4.8–10.8)
WBC # FLD AUTO: 14.14 K/UL — HIGH (ref 4.8–10.8)

## 2021-12-12 PROCEDURE — ZZZZZ: CPT

## 2021-12-12 PROCEDURE — 76705 ECHO EXAM OF ABDOMEN: CPT | Mod: 26

## 2021-12-12 PROCEDURE — 93308 TTE F-UP OR LMTD: CPT | Mod: 26

## 2021-12-12 PROCEDURE — 71045 X-RAY EXAM CHEST 1 VIEW: CPT | Mod: 26

## 2021-12-12 PROCEDURE — 99291 CRITICAL CARE FIRST HOUR: CPT | Mod: 25

## 2021-12-12 PROCEDURE — 70450 CT HEAD/BRAIN W/O DYE: CPT | Mod: 26,MA

## 2021-12-12 PROCEDURE — 74177 CT ABD & PELVIS W/CONTRAST: CPT | Mod: 26,MA

## 2021-12-12 PROCEDURE — 93010 ELECTROCARDIOGRAM REPORT: CPT

## 2021-12-12 RX ORDER — FOLIC ACID 0.8 MG
1 TABLET ORAL ONCE
Refills: 0 | Status: COMPLETED | OUTPATIENT
Start: 2021-12-12 | End: 2021-12-12

## 2021-12-12 RX ORDER — SODIUM CHLORIDE 9 MG/ML
1000 INJECTION, SOLUTION INTRAVENOUS
Refills: 0 | Status: DISCONTINUED | OUTPATIENT
Start: 2021-12-12 | End: 2021-12-13

## 2021-12-12 RX ORDER — THIAMINE MONONITRATE (VIT B1) 100 MG
100 TABLET ORAL DAILY
Refills: 0 | Status: DISCONTINUED | OUTPATIENT
Start: 2021-12-12 | End: 2021-12-15

## 2021-12-12 RX ORDER — DEXTROSE 50 % IN WATER 50 %
50 SYRINGE (ML) INTRAVENOUS ONCE
Refills: 0 | Status: COMPLETED | OUTPATIENT
Start: 2021-12-12 | End: 2021-12-12

## 2021-12-12 RX ORDER — VANCOMYCIN HCL 1 G
1000 VIAL (EA) INTRAVENOUS ONCE
Refills: 0 | Status: COMPLETED | OUTPATIENT
Start: 2021-12-12 | End: 2021-12-12

## 2021-12-12 RX ORDER — CEFEPIME 1 G/1
2000 INJECTION, POWDER, FOR SOLUTION INTRAMUSCULAR; INTRAVENOUS ONCE
Refills: 0 | Status: COMPLETED | OUTPATIENT
Start: 2021-12-12 | End: 2021-12-12

## 2021-12-12 RX ORDER — SODIUM CHLORIDE 9 MG/ML
1000 INJECTION, SOLUTION INTRAVENOUS ONCE
Refills: 0 | Status: COMPLETED | OUTPATIENT
Start: 2021-12-12 | End: 2021-12-12

## 2021-12-12 RX ORDER — THIAMINE MONONITRATE (VIT B1) 100 MG
100 TABLET ORAL ONCE
Refills: 0 | Status: COMPLETED | OUTPATIENT
Start: 2021-12-12 | End: 2021-12-12

## 2021-12-12 RX ORDER — OCTREOTIDE ACETATE 200 UG/ML
50 INJECTION, SOLUTION INTRAVENOUS; SUBCUTANEOUS EVERY 6 HOURS
Refills: 0 | Status: DISCONTINUED | OUTPATIENT
Start: 2021-12-12 | End: 2021-12-14

## 2021-12-12 RX ADMIN — Medication 50 MILLILITER(S): at 14:40

## 2021-12-12 RX ADMIN — OCTREOTIDE ACETATE 50 MICROGRAM(S): 200 INJECTION, SOLUTION INTRAVENOUS; SUBCUTANEOUS at 23:03

## 2021-12-12 RX ADMIN — Medication 100 MILLIGRAM(S): at 20:44

## 2021-12-12 RX ADMIN — SODIUM CHLORIDE 1000 MILLILITER(S): 9 INJECTION, SOLUTION INTRAVENOUS at 17:58

## 2021-12-12 RX ADMIN — CEFEPIME 100 MILLIGRAM(S): 1 INJECTION, POWDER, FOR SOLUTION INTRAMUSCULAR; INTRAVENOUS at 20:45

## 2021-12-12 RX ADMIN — SODIUM CHLORIDE 1000 MILLILITER(S): 9 INJECTION, SOLUTION INTRAVENOUS at 14:40

## 2021-12-12 RX ADMIN — Medication 250 MILLIGRAM(S): at 20:44

## 2021-12-12 RX ADMIN — Medication 1 MILLIGRAM(S): at 23:02

## 2021-12-12 NOTE — H&P ADULT - NSHPLABSRESULTS_GEN_ALL_CORE
CBC Full  -  ( 12 Dec 2021 14:41 )  WBC Count : 14.14 K/uL  RBC Count : 4.04 M/uL  Hemoglobin : 12.3 g/dL  Hematocrit : 38.8 %  Platelet Count - Automated : 312 K/uL  Mean Cell Volume : 96.0 fL  Mean Cell Hemoglobin : 30.4 pg  Mean Cell Hemoglobin Concentration : 31.7 g/dL  Auto Neutrophil # : 11.76 K/uL  Auto Lymphocyte # : 1.35 K/uL  Auto Monocyte # : 0.74 K/uL  Auto Eosinophil # : 0.05 K/uL  Auto Basophil # : 0.08 K/uL  Auto Neutrophil % : 83.2 %  Auto Lymphocyte % : 9.5 %  Auto Monocyte % : 5.2 %  Auto Eosinophil % : 0.4 %  Auto Basophil % : 0.6 %    12-12    137  |  103  |  9<L>  ----------------------------<  29<LL>  4.5   |  12<L>  |  0.7    Ca    8.6      12 Dec 2021 14:41    TPro  7.7  /  Alb  4.2  /  TBili  0.8  /  DBili  x   /  AST  109<H>  /  ALT  45<H>  /  AlkPhos  70  12-12  Blood Gas Profile - Venous (12.12.21 @ 16:46)    pH, Venous: 7.19    pCO2, Venous: 50 mmHg    pO2, Venous: 37 mmHg    HCO3, Venous: 19 mmol/L    Base Excess, Venous: -9.1 mmol/L    Oxygen Saturation, Venous: 58.6 %  < from: CT Abdomen and Pelvis w/ IV Cont (12.12.21 @ 19:54) >    IMPRESSION:    Findings compatible with proctocolitis involving the rectum, sigmoid and   descending colon, given the appropriate clinical setting.    --- End of Report ---    < end of copied text >

## 2021-12-12 NOTE — ED ADULT NURSE NOTE - NSIMPLEMENTINTERV_GEN_ALL_ED
Implemented All Fall with Harm Risk Interventions:  [place] to call system. Call bell, personal items and telephone within reach. Instruct patient to call for assistance. Room bathroom lighting operational. Non-slip footwear when patient is off stretcher. Physically safe environment: no spills, clutter or unnecessary equipment. Stretcher in lowest position, wheels locked, appropriate side rails in place. Provide visual cue, wrist band, yellow gown, etc. Monitor gait and stability. Monitor for mental status changes and reorient to person, place, and time. Review medications for side effects contributing to fall risk. Reinforce activity limits and safety measures with patient and family. Provide visual clues: red socks.

## 2021-12-12 NOTE — H&P ADULT - ASSESSMENT
41 year old F with a PMHx of polysubstance abuse (specifically alcohol, percocet, ecstasy) presents for a 1 day history of altered mental status.    Impression  Altered Mental Status 2/2 Opioid Overdose vs. Possible Toxic Alcohol ingestion  Hypothermia-->Doubt Thyroid Pathology (Could be 2/2 to underlying sepsis)   HAGMA 2/2 to type A lactic acidosis 2/2 to starvation ketoacidosis w/hypoglycemia along with respiratory acidosis    Sepsis present on admission 2/2 to proctocolitis involving the rectum, sigmoid and descending colon.     IMPRESSION:      PLAN:    CNS: ABSOLUTELY NO CNS depressants of any kind in this patient unless it is for alcohol withdrawal (pateint's alcohol level is .099, will take 12-24 for possible etoh withdrawal signs to appear).  Patient has no focal neurologic deficits upon examination.  Patient does not have evidence of intranuclear ophthalmalgia suggestive of wernicke encephalopathy.  Thus I will c/w thiamine 100mg IV qdaily and not 500mg IV q8.   Meninges signs are negative as well (doubt meningitis as a ddx as well). Will also send for B1, B6, B12, Folate, RPR, ESR, CRP. Toxicology--> Octreotide 50mcg IV q6 and c/w D5 gtt.     HEENT: Oral care, aspiration precautions. I highly doubt myxedema coma in this patient as this patient had no history of hypothyroidism.  Although patient was hypothermic, patient was not bradycardic nor did the CMP demonstrate hyponatremia.  BP was also negative for narrow pulse pressure as well.  Will send for TSH and T4.       PULMONARY:  Patient as hypopneic upon presentation with shallow breathing; Initial VBG demonstrated a 7.22/50/33 w/lactate of 4.5, indicating respiratory acidosis as well.  But patients' RR was 19 upon examination.   CXR was unremarkable.     CARDIOVASCULAR: Patient was not bradycardic upon admission.  EKG demonstrated normal sinus rhythm w/o ischemic changes, QTc 517. Avoid QTc prolong agents as well.     GI: GI prophylaxis. Aspiration precaution     RENAL:  CMP negative for an OLU as well or electrolyte derangements.  Patient has HAGMA secondary to starvation ketoacidosis as BHB was .6 and the patient was hypoglycemic. Currently on D5 200cc/hour.       INFECTIOUS DISEASE: CT A/P demonstrated proctocolitis-->will start cefepime and vanc for now-->f/u Bcx for now.    HEMATOLOGICAL:  DVT prophylaxis.    ENDOCRINE:  Follow up FS q1hour.  I highly doubt myxedema coma in this patient as this patient had no history of hypothyroidism.  Although patient was hypothermic, patient was not bradycardic nor did the CMP demonstrate hyponatremia.  Currently on D5 200cc/hour.      BP was also negative for narrow pulse pressure as well.  Will send for TSH and T4.       MUSCULOSKELETAL: Bedrest    MICU Monitoring.          41 year old F with a PMHx of polysubstance abuse (specifically alcohol, percocet, ecstasy) presents for a 1 day history of altered mental status.    Impression  Altered Mental Status 2/2 Opioid Overdose vs. Possible Toxic Alcohol ingestion  Hypothermia-->Doubt Thyroid Pathology (Could be 2/2 to underlying sepsis)   HAGMA 2/2 to type A lactic acidosis 2/2 to starvation ketoacidosis w/hypoglycemia along with respiratory acidosis    Sepsis present on admission 2/2 to proctocolitis involving the rectum, sigmoid and descending colon.     IMPRESSION:      PLAN:    CNS: ABSOLUTELY NO CNS depressants of any kind in this patient unless it is for alcohol withdrawal (pateint's alcohol level is .099, will take 12-24 for possible etoh withdrawal signs to appear).  Obtain, methadone level. Patient has no focal neurologic deficits upon examination.  Patient does not have evidence of intranuclear ophthalmalgia suggestive of wernicke encephalopathy.  Thus I will c/w thiamine 100mg IV qdaily and not 500mg IV q8.   Meninges signs are negative as well (doubt meningitis as a ddx as well). Will also send for B1, B6, B12, Folate, RPR, ESR, CRP. Toxicology--> Octreotide 50mcg IV q6 and c/w D5 gtt.     HEENT: Oral care, aspiration precautions. I highly doubt myxedema coma in this patient as this patient had no history of hypothyroidism.  Although patient was hypothermic, patient was not bradycardic nor did the CMP demonstrate hyponatremia.  BP was also negative for narrow pulse pressure as well.  Will send for TSH and T4.       PULMONARY:  Patient as hypopneic upon presentation with shallow breathing; Initial VBG demonstrated a 7.22/50/33 w/lactate of 4.5, indicating respiratory acidosis as well.  But patients' RR was 19 upon examination.   CXR was unremarkable.     CARDIOVASCULAR: Patient was not bradycardic upon admission.  EKG demonstrated normal sinus rhythm w/o ischemic changes, QTc 517. Avoid QTc prolong agents as well.     GI: GI prophylaxis. Aspiration precaution     RENAL:  CMP negative for an OLU as well or electrolyte derangements.  Patient has HAGMA secondary to starvation ketoacidosis as BHB was .6 and the patient was hypoglycemic. Currently on D5 200cc/hour.       INFECTIOUS DISEASE: CT A/P demonstrated proctocolitis-->will start cefepime and vanc for now-->f/u Bcx for now.    HEMATOLOGICAL:  DVT prophylaxis.    ENDOCRINE:  Follow up FS q1hour.  I highly doubt myxedema coma in this patient as this patient had no history of hypothyroidism.  Although patient was hypothermic, patient was not bradycardic nor did the CMP demonstrate hyponatremia.  Currently on D5 200cc/hour.      BP was also negative for narrow pulse pressure as well.  Will send for TSH and T4.  f/u =, c peptide level, insulin level.       MUSCULOSKELETAL: Bedrest    MICU Monitoring.
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
## 2021-12-12 NOTE — ED PROVIDER NOTE - PROGRESS NOTE DETAILS
TN - pt slightly more arousable; labs sig for urine gluc >1000, +ketones: WBC 14: lactate 4.5: Plans DKA labs, cultures, repeat POC glucose, f/u lactate, replete fluids. pt admits to Suboxone use.

## 2021-12-12 NOTE — ED PROVIDER NOTE - CARE PLAN
Principal Discharge DX:	Altered mental status  Secondary Diagnosis:	Metabolic acidosis  Secondary Diagnosis:	Hypothermia not due to cold exposure  Secondary Diagnosis:	Hypoglycemia  Secondary Diagnosis:	Polysubstance abuse   1

## 2021-12-12 NOTE — H&P ADULT - NSHPREVIEWOFSYSTEMS_GEN_ALL_CORE

## 2021-12-12 NOTE — H&P ADULT - HISTORY OF PRESENT ILLNESS
*Collateral history also taken from ED note as well.     This is a 41 year old F with a PMHx of polysubstance abuse (specifically alcohol, percocet, ecstasy) presents for a 1 day history of altered mental status.  This morning, patient was endorsing having taken " cocaine.  She is unable to give me an exact amount of cocaine she snorted, but she said it was a large amount  She endorsed also a large volume of bottle of [place] dark liquor as well.  Several hours later, she was found on the toilet by a [employer] employee, unresponsive w/white powder on the table, and EMS was subsequently called.  Upon interview, the patient also endorsed crushing subuxone pills and snorting them.  She also endorsed also putting novacaine in her nostrils as her nose was burning as well.  She also endorses having multiple episodes of NBNB prior to admission. She denies having ingested toxic alcohols such as methanol, ethylene glycol or isopropyl alcohol, she also denies having a history of diabetes and having access to exogenous insulin.  She also denies opioid intake as well, despite her previous history of percocet abuse.    The patient has been also taking multiple tabs of ecstasy each day for "three years." She denies chest pain, sob, abdominal pain, fever, rigors.     ICU Vital Signs Last 24 Hrs  T(C): 37.1 (12 Dec 2021 20:20), Max: 37.1 (12 Dec 2021 20:20)  T(F): 98.8 (12 Dec 2021 20:20), Max: 98.8 (12 Dec 2021 20:20)  HR: 78 (12 Dec 2021 20:28) (58 - 78)  BP: 139/79 (12 Dec 2021 20:28) (136/75 - 168/87)  BP(mean): --  ABP: --  ABP(mean): --  RR: 18 (12 Dec 2021 20:28) (18 - 19)  SpO2: 100% (12 Dec 2021 20:28) (99% - 100%)      In the ED: CBC demonstrated demonstrated a WBC of 14K, Hgb 12.3, Initial FS was 194 but downtrended to 65, BHB .6, CMP demonstrated a Na 137, K 4.5, Cl 103, HCO3 12, AG 22, BUN 9, SCr .7, Glucose 29, Calcium 8.6, Albumin 4.2, AST//45.  Initial VBG demonstrated a 7.22/50/33 w/lactate of 4.5 which has downtrended to 3.9. UA +ve for glucose and ketones, BHB was .6, Alcohol level was .099, Tylenol and ASA levels are unremarkable. Serum osmolarity, B1, B12, B6, RPR, ESR, CRP Ammonia levels are pending.  CT A/P demonstrated proctocolitis involving the rectum, sigmoid and descending colon. Patient is s/p 2L of LR, s/p 1 dose of cefepime and vancomycin, two doses of 50mg IV D50.  Toxicology was consulted and recommended a D5 gtt and to start octreotide.  41 year old F with a PMHx of polysubstance abuse (specifically alcohol, percocet, ecstasy) presents for a 1 day history of altered mental status.  This morning, patient was endorsing having taken " cocaine.  She is unable to give me an exact amount of cocaine she snorted, but she said it was a large amount  She endorsed also a large volume of bottle of [place] dark liquor as well.  Several hours later, she was found on the toilet by a [employer] employee, unresponsive w/white powder on the table, and EMS was subsequently called.  Upon interview, the patient also endorsed crushing subuxone pills and snorting them.  She also endorsed also putting novacaine in her nostrils as her nose was burning as well.  She also endorses having multiple episodes of NBNB prior to admission. She denies having ingested toxic alcohols such as methanol, ethylene glycol or isopropyl alcohol, she also denies having a history of diabetes and having access to exogenous insulin.  She also denies opioid intake as well, despite her previous history of percocet abuse.    The patient has been also taking multiple tabs of ecstasy each day for "three years." She denies chest pain, sob, abdominal pain, fever, rigors.     ICU Vital Signs Last 24 Hrs  T(C): 37.1 (12 Dec 2021 20:20), Max: 37.1 (12 Dec 2021 20:20)  T(F): 98.8 (12 Dec 2021 20:20), Max: 98.8 (12 Dec 2021 20:20)  HR: 78 (12 Dec 2021 20:28) (58 - 78)  BP: 139/79 (12 Dec 2021 20:28) (136/75 - 168/87)  BP(mean): --  ABP: --  ABP(mean): --  RR: 18 (12 Dec 2021 20:28) (18 - 19)  SpO2: 100% (12 Dec 2021 20:28) (99% - 100%)      In the ED: CBC demonstrated demonstrated a WBC of 14K, Hgb 12.3, Initial FS was 194 but downtrended to 65, BHB .6, CMP demonstrated a Na 137, K 4.5, Cl 103, HCO3 12, AG 22, BUN 9, SCr .7, Glucose 29, Calcium 8.6, Albumin 4.2, AST//45.  Initial VBG demonstrated a 7.22/50/33 w/lactate of 4.5 which has downtrended to 3.9. UA +ve for glucose and ketones, BHB was .6, Alcohol level was .099, Tylenol and ASA levels are unremarkable. Serum osmolarity, B1, B12, B6, RPR, ESR, CRP Ammonia levels are pending.  CT A/P demonstrated proctocolitis involving the rectum, sigmoid and descending colon. Patient is s/p 2L of LR, s/p 1 dose of cefepime and vancomycin, two doses of 50mg IV D50.  Toxicology was consulted and recommended a D5 gtt and to start octreotide.  46  year old F with a PMHx of polysubstance abuse (specifically alcohol, percocet, ecstasy) presents for a 1 day history of altered mental status.  This morning, patient was endorsing having taken " cocaine.  She is unable to give me an exact amount of cocaine she snorted, but she said it was a large amount  She endorsed also a large volume of bottle of [place] dark liquor as well.  Several hours later, she was found on the toilet by a [employer] employee, unresponsive w/white powder on the table, and EMS was subsequently called.  Upon interview, the patient also endorsed crushing subuxone pills and snorting them.  She also endorsed also putting novacaine in her nostrils as her nose was burning as well.  She also endorses having multiple episodes of NBNB prior to admission. She denies having ingested toxic alcohols such as methanol, ethylene glycol or isopropyl alcohol, she also denies having a history of diabetes and having access to exogenous insulin.  She also denies opioid intake as well, despite her previous history of percocet abuse.    The patient has been also taking multiple tabs of ecstasy each day for "three years." She denies chest pain, sob, abdominal pain, fever, rigors.     ICU Vital Signs Last 24 Hrs  T(C): 37.1 (12 Dec 2021 20:20), Max: 37.1 (12 Dec 2021 20:20)  T(F): 98.8 (12 Dec 2021 20:20), Max: 98.8 (12 Dec 2021 20:20)  HR: 78 (12 Dec 2021 20:28) (58 - 78)  BP: 139/79 (12 Dec 2021 20:28) (136/75 - 168/87)  BP(mean): --  ABP: --  ABP(mean): --  RR: 18 (12 Dec 2021 20:28) (18 - 19)  SpO2: 100% (12 Dec 2021 20:28) (99% - 100%)      In the ED: CBC demonstrated demonstrated a WBC of 14K, Hgb 12.3, Initial FS was 194 but downtrended to 65, BHB .6, CMP demonstrated a Na 137, K 4.5, Cl 103, HCO3 12, AG 22, BUN 9, SCr .7, Glucose 29, Calcium 8.6, Albumin 4.2, AST//45.  Initial VBG demonstrated a 7.22/50/33 w/lactate of 4.5 which has downtrended to 3.9. UA +ve for glucose and ketones, BHB was .6, Alcohol level was .099, Tylenol and ASA levels are unremarkable. Serum osmolarity, B1, B12, B6, RPR, ESR, CRP Ammonia levels are pending.  CT A/P demonstrated proctocolitis involving the rectum, sigmoid and descending colon. Patient is s/p 2L of LR, s/p 1 dose of cefepime and vancomycin, two doses of 50mg IV D50.  Toxicology was consulted and recommended a D5 gtt and to start octreotide.

## 2021-12-12 NOTE — CONSULT NOTE ADULT - ASSESSMENT
47 yo BIBEMS for AMS after cocaine, alcohol, Saboxone use. In he ED, patient presents with sedative hypnotic toxidrome and is note to have persistent hypoglycemia and initial hypothermia.     problem: persistent hypoglycemia.   - Broad differential including: quinine contaminant, methadone use, starvation ketosis, sulfonylurea contaminant/overdose, insulin overdose, insulinoma, hypothyroidism, sepsis.   - Obtain, methadone level, sulfonylurea level, c peptide level, insulin level.  - Give 500mg Thiamine  - Give Octreotide 50mcg q6 hours for four doses. Hold if two consecutive blood glucose > 200.   - Feed patient meal with complex carbohydrate, fats and proteins. If patient becomes hypoglycemia, D50 to maintain blood glucose >90.   - q30 minute fingersticks for initial 2 hours; extend to q4 afterwards.   - admit to ICU  - case discussed with attending    Thank you for this consult  Toxicology consults: [phone]   47 yo BIBEMS for AMS after cocaine, alcohol, Saboxone use. In he ED, patient presents with sedative hypnotic toxidrome and is note to have persistent hypoglycemia and initial hypothermia.     problem: persistent hypoglycemia.   - Broad differential including: quinine contaminant, methadone use, starvation ketosis, sulfonylurea contaminant/overdose, insulin overdose, insulinoma, hypothyroidism, sepsis.   - Obtain, methadone level, sulfonylurea level, c peptide level, insulin level.  - Give 500mg Thiamine  - Give Octreotide 50mcg q6 hours for four doses. Hold if two consecutive blood glucose > 200.   - Feed patient meal with complex carbohydrate, fats and proteins. If patient becomes hypoglycemia, D50 to maintain blood glucose >90.   - q30 minute fingersticks for initial 2 hours; extend to q4 afterwards.   - admit to ICU  - case discussed with attending    Thank you for this consult  Toxicology consults: [phone]    I personally discussed with ED team. I reviewed the med tox fellow’s note (as assigned above), and agree with the findings and plan except as documented in my note.  -  Drug and alcohol use and now with hypoglycemia  -  Hypoglycemia as well as QT prolongation  -  Multiple drugs in differential Dx  -  Methadone, sulfonylurea adulterant, quinine, ethanol  -  Treatment and management as above    --Will continue to follow. Please call with any further questions    E[place]    [phone] (pager)

## 2021-12-12 NOTE — ED ADULT NURSE NOTE - OBJECTIVE STATEMENT
patient biba for ams, found unresponsive in hotel by ems. fs 34 on scene. bag of "white powder" by patient as per ems. Pt admits fro cocaine, and alcohol, patient got 1amp of dextrose and then patient more responsive to verbal and pain, states name is Randi, states havent eaten since yesterday.

## 2021-12-12 NOTE — ED PROVIDER NOTE - CLINICAL SUMMARY MEDICAL DECISION MAKING FREE TEXT BOX
42 y/o F pmh polysubstance abuse p/w poor responsiveness and hypoglycemia. Found at hotel on toilet with etoh bottles and unknown white powder nearby. Pt endorses cocaine and etoh use.     In ED pt is obtunded, arouses to painful stimuli, follows 1 step command then doses off. Pupils 2-3mm, PERRL, EOMI,  moves all extrem, CTAB, RR 10-12, RRR.    Pt found to be hypoglycemic Pt given 2amps d50, later hypoglycemic again, d50 redosed. Pt also found to be hypothermic, bear hugger applied.    Labs showed mixed resp and metabolic acidosis, HAGMA, with lactemia, ketosis, and +ETOH.   Pt gradually became more awake, but still sleepy. Also endorsed take suboxone. Denies other drugs, toxic alcohols, or rx drugs.     Considered sepsis, AKA, starvation ketosis, thyroid issue, toxic alcohol or other intoxicant.  Case discussed w/ tox who feel clinical picture favors AKA and possible intoxication from methadone (adulterant to cocaine).     Pt clinical improved, more awake, tolerating PO 42 y/o F pmh polysubstance abuse p/w poor responsiveness and hypoglycemia. Found at hotel on toilet with etoh bottles and unknown white powder nearby. Pt endorses cocaine and etoh use.     In ED pt is obtunded, arouses to painful stimuli, follows 1 step command then doses off. Pupils 2-3mm, PERRL, EOMI,  moves all extrem, CTAB, RR 10-12, RRR.    Pt found to be hypoglycemic Pt given 2amps d50, later hypoglycemic again, d50 redosed. Pt also found to be hypothermic, bear hugger applied.    Labs showed mixed resp and metabolic acidosis, HAGMA, with lactemia, ketosis, and +ETOH.   Pt gradually became more awake, but still sleepy. Also endorsed take suboxone. Denies other drugs, toxic alcohols, or rx drugs.     Considered sepsis, AKA, starvation ketosis, thyroid issue, toxic alcohol or other intoxicant.  Case discussed w/ tox who feel clinical picture favors AKA and possible intoxication from methadone (adulterant to cocaine).     Pt clinical improved, more awake but still sleepy, tolerated PO.     Pt admitted to ICU for prolonged intoxication/ AMS, HAGMA, hypoglycemia hypothermia for further w/up and management.    .

## 2021-12-12 NOTE — H&P ADULT - ATTENDING COMMENTS
Events noted, altered MS, drug abuse ( cocaine/ alcohol/ subaxone), hypoglycemia/ sp tox eval, on D5W, fup FS, U tox, deep fup ( head CT/ CT AP reviewed, procal, ABX, SDU

## 2021-12-12 NOTE — ED PROVIDER NOTE - PHYSICAL EXAMINATION
CONSTITUTIONAL: Well-appearing; well-nourished;  HEAD: Normocephalic; atraumatic.   EYES: PERRL; EOM intact. Conjunctiva normal B/L. pupils 3mm, reactive to light  ENT: Normal pharynx with no tonsillar hypertrophy. MMM.  NECK: Supple; non-tender; no cervical lymphadenopathy.   CHEST: Normal chest excursion with respiration.   CARDIOVASCULAR: Normal S1, S2; no murmurs, rubs, or gallops.   RESPIRATORY: Normal chest excursion with respiration; breath sounds clear and equal bilaterally; no wheezes, rhonchi, or rales.  GI/: Normal bowel sounds; non-distended; non-tender.   BACK: No evidence of trauma or deformity. Non-tender to palpation. No CVA tenderness.   EXT: Normal ROM in all four extremities; non-tender to palpation; distal pulses are normal. No leg edema B/L.   SKIN: Normal for age and race; warm; dry; good turgor. no signs of trauma.   NEURO: A & O x 4; CN 2-12 intact. Grossly unremarkable.

## 2021-12-12 NOTE — H&P ADULT - NSHPPHYSICALEXAM_GEN_ALL_CORE
PHYSICAL EXAM:  GENERAL: Patient waxes and wanes, but is able to coherently answer my questions upon interview.   HEAD:  Atraumatic, Normocephalic  EYES: EOMI, PERRLA, pinpoint pupils noted on exam.   ENT: Moist mucous membranes  CHEST/LUNG: Clear to auscultation bilaterally; No rales, rhonchi, wheezing, or rubs.  HEART: Regular rate and rhythm; No murmurs, rubs, or gallops  ABDOMEN: Bowel sounds present; Soft, Nontender, Nondistended.   EXTREMITIES:  2+ Peripheral Pulses, brisk capillary refill. No clubbing, cyanosis, or edema  NERVOUS SYSTEM:  Patient was able to answer my questions coherently but waxes and wanes, no motor or sensory deficits, noted on exam.   MSK: FROM all 4 extremities, full and equal strength

## 2021-12-13 LAB
ALBUMIN SERPL ELPH-MCNC: 3.5 G/DL — SIGNIFICANT CHANGE UP (ref 3.5–5.2)
ALP SERPL-CCNC: 61 U/L — SIGNIFICANT CHANGE UP (ref 30–115)
ALT FLD-CCNC: 42 U/L — HIGH (ref 0–41)
AMMONIA BLD-MCNC: 37 UMOL/L — SIGNIFICANT CHANGE UP (ref 11–55)
AMPHET UR-MCNC: SIGNIFICANT CHANGE UP
ANION GAP SERPL CALC-SCNC: 12 MMOL/L — SIGNIFICANT CHANGE UP (ref 7–14)
AST SERPL-CCNC: 29 U/L — SIGNIFICANT CHANGE UP (ref 0–41)
BARBITURATES UR SCN-MCNC: SIGNIFICANT CHANGE UP
BASE EXCESS BLDV CALC-SCNC: -0.5 MMOL/L — SIGNIFICANT CHANGE UP (ref -2–3)
BASOPHILS # BLD AUTO: 0.04 K/UL — SIGNIFICANT CHANGE UP (ref 0–0.2)
BASOPHILS NFR BLD AUTO: 0.4 % — SIGNIFICANT CHANGE UP (ref 0–1)
BENZODIAZ UR-MCNC: SIGNIFICANT CHANGE UP
BILIRUB SERPL-MCNC: 0.8 MG/DL — SIGNIFICANT CHANGE UP (ref 0.2–1.2)
BUN SERPL-MCNC: 8 MG/DL — LOW (ref 10–20)
CA-I SERPL-SCNC: 1.15 MMOL/L — SIGNIFICANT CHANGE UP (ref 1.15–1.33)
CALCIUM SERPL-MCNC: 8.6 MG/DL — SIGNIFICANT CHANGE UP (ref 8.5–10.1)
CHLORIDE SERPL-SCNC: 104 MMOL/L — SIGNIFICANT CHANGE UP (ref 98–110)
CO2 SERPL-SCNC: 18 MMOL/L — SIGNIFICANT CHANGE UP (ref 17–32)
COCAINE METAB.OTHER UR-MCNC: SIGNIFICANT CHANGE UP
CREAT SERPL-MCNC: 0.7 MG/DL — SIGNIFICANT CHANGE UP (ref 0.7–1.5)
CRP SERPL-MCNC: 9 MG/L — HIGH
EOSINOPHIL # BLD AUTO: 0.2 K/UL — SIGNIFICANT CHANGE UP (ref 0–0.7)
EOSINOPHIL NFR BLD AUTO: 2.2 % — SIGNIFICANT CHANGE UP (ref 0–8)
ERYTHROCYTE [SEDIMENTATION RATE] IN BLOOD: 15 MM/HR — SIGNIFICANT CHANGE UP (ref 0–20)
FOLATE SERPL-MCNC: 11 NG/ML — SIGNIFICANT CHANGE UP
GAS PNL BLDV: 134 MMOL/L — LOW (ref 136–145)
GAS PNL BLDV: SIGNIFICANT CHANGE UP
GLUCOSE BLDC GLUCOMTR-MCNC: 111 MG/DL — HIGH (ref 70–99)
GLUCOSE BLDC GLUCOMTR-MCNC: 113 MG/DL — HIGH (ref 70–99)
GLUCOSE BLDC GLUCOMTR-MCNC: 115 MG/DL — HIGH (ref 70–99)
GLUCOSE BLDC GLUCOMTR-MCNC: 141 MG/DL — HIGH (ref 70–99)
GLUCOSE BLDC GLUCOMTR-MCNC: 147 MG/DL — HIGH (ref 70–99)
GLUCOSE BLDC GLUCOMTR-MCNC: 154 MG/DL — HIGH (ref 70–99)
GLUCOSE BLDC GLUCOMTR-MCNC: 175 MG/DL — HIGH (ref 70–99)
GLUCOSE BLDC GLUCOMTR-MCNC: 226 MG/DL — HIGH (ref 70–99)
GLUCOSE BLDC GLUCOMTR-MCNC: 85 MG/DL — SIGNIFICANT CHANGE UP (ref 70–99)
GLUCOSE BLDC GLUCOMTR-MCNC: 95 MG/DL — SIGNIFICANT CHANGE UP (ref 70–99)
GLUCOSE SERPL-MCNC: 185 MG/DL — HIGH (ref 70–99)
HCO3 BLDV-SCNC: 25 MMOL/L — SIGNIFICANT CHANGE UP (ref 22–29)
HCT VFR BLD CALC: 36.5 % — LOW (ref 37–47)
HCT VFR BLDA CALC: 36 % — SIGNIFICANT CHANGE UP (ref 34.5–46.5)
HGB BLD CALC-MCNC: 12 G/DL — SIGNIFICANT CHANGE UP (ref 11.7–16.1)
HGB BLD-MCNC: 12.1 G/DL — SIGNIFICANT CHANGE UP (ref 12–16)
IMM GRANULOCYTES NFR BLD AUTO: 0.2 % — SIGNIFICANT CHANGE UP (ref 0.1–0.3)
LACTATE BLDV-MCNC: 1.3 MMOL/L — SIGNIFICANT CHANGE UP (ref 0.5–2)
LYMPHOCYTES # BLD AUTO: 0.98 K/UL — LOW (ref 1.2–3.4)
LYMPHOCYTES # BLD AUTO: 10.9 % — LOW (ref 20.5–51.1)
MAGNESIUM SERPL-MCNC: 1.9 MG/DL — SIGNIFICANT CHANGE UP (ref 1.8–2.4)
MCHC RBC-ENTMCNC: 30.6 PG — SIGNIFICANT CHANGE UP (ref 27–31)
MCHC RBC-ENTMCNC: 33.2 G/DL — SIGNIFICANT CHANGE UP (ref 32–37)
MCV RBC AUTO: 92.4 FL — SIGNIFICANT CHANGE UP (ref 81–99)
METHADONE UR-MCNC: SIGNIFICANT CHANGE UP
MONOCYTES # BLD AUTO: 0.53 K/UL — SIGNIFICANT CHANGE UP (ref 0.1–0.6)
MONOCYTES NFR BLD AUTO: 5.9 % — SIGNIFICANT CHANGE UP (ref 1.7–9.3)
NEUTROPHILS # BLD AUTO: 7.18 K/UL — HIGH (ref 1.4–6.5)
NEUTROPHILS NFR BLD AUTO: 80.4 % — HIGH (ref 42.2–75.2)
NRBC # BLD: 0 /100 WBCS — SIGNIFICANT CHANGE UP (ref 0–0)
OPIATES UR-MCNC: SIGNIFICANT CHANGE UP
OSMOLALITY SERPL: 284 MOS/KG — SIGNIFICANT CHANGE UP (ref 275–300)
PCO2 BLDV: 46 MMHG — HIGH (ref 39–42)
PCP SPEC-MCNC: SIGNIFICANT CHANGE UP
PH BLDV: 7.35 — SIGNIFICANT CHANGE UP (ref 7.32–7.43)
PHOSPHATE SERPL-MCNC: 3.4 MG/DL — SIGNIFICANT CHANGE UP (ref 2.1–4.9)
PLATELET # BLD AUTO: 288 K/UL — SIGNIFICANT CHANGE UP (ref 130–400)
PO2 BLDV: 36 MMHG — SIGNIFICANT CHANGE UP
POTASSIUM BLDV-SCNC: 4.1 MMOL/L — SIGNIFICANT CHANGE UP (ref 3.5–5.1)
POTASSIUM SERPL-MCNC: 4 MMOL/L — SIGNIFICANT CHANGE UP (ref 3.5–5)
POTASSIUM SERPL-SCNC: 4 MMOL/L — SIGNIFICANT CHANGE UP (ref 3.5–5)
PROCALCITONIN SERPL-MCNC: 0.57 NG/ML — HIGH (ref 0.02–0.1)
PROPOXYPHENE QUALITATIVE URINE RESULT: SIGNIFICANT CHANGE UP
PROT SERPL-MCNC: 6.1 G/DL — SIGNIFICANT CHANGE UP (ref 6–8)
RBC # BLD: 3.95 M/UL — LOW (ref 4.2–5.4)
RBC # FLD: 12.5 % — SIGNIFICANT CHANGE UP (ref 11.5–14.5)
SAO2 % BLDV: 64.1 % — SIGNIFICANT CHANGE UP
SODIUM SERPL-SCNC: 134 MMOL/L — LOW (ref 135–146)
T4 AB SER-ACNC: 7.6 UG/DL — SIGNIFICANT CHANGE UP (ref 4.6–12)
TSH SERPL-MCNC: 0.31 UIU/ML — SIGNIFICANT CHANGE UP (ref 0.27–4.2)
VIT B12 SERPL-MCNC: 812 PG/ML — SIGNIFICANT CHANGE UP (ref 232–1245)
WBC # BLD: 8.95 K/UL — SIGNIFICANT CHANGE UP (ref 4.8–10.8)
WBC # FLD AUTO: 8.95 K/UL — SIGNIFICANT CHANGE UP (ref 4.8–10.8)

## 2021-12-13 PROCEDURE — 99222 1ST HOSP IP/OBS MODERATE 55: CPT

## 2021-12-13 RX ORDER — ENOXAPARIN SODIUM 100 MG/ML
40 INJECTION SUBCUTANEOUS DAILY
Refills: 0 | Status: DISCONTINUED | OUTPATIENT
Start: 2021-12-13 | End: 2021-12-15

## 2021-12-13 RX ORDER — CEFEPIME 1 G/1
2000 INJECTION, POWDER, FOR SOLUTION INTRAMUSCULAR; INTRAVENOUS EVERY 12 HOURS
Refills: 0 | Status: DISCONTINUED | OUTPATIENT
Start: 2021-12-13 | End: 2021-12-14

## 2021-12-13 RX ORDER — VANCOMYCIN HCL 1 G
750 VIAL (EA) INTRAVENOUS EVERY 12 HOURS
Refills: 0 | Status: DISCONTINUED | OUTPATIENT
Start: 2021-12-13 | End: 2021-12-14

## 2021-12-13 RX ORDER — MAGNESIUM SULFATE 500 MG/ML
1 VIAL (ML) INJECTION ONCE
Refills: 0 | Status: COMPLETED | OUTPATIENT
Start: 2021-12-13 | End: 2021-12-13

## 2021-12-13 RX ORDER — SODIUM CHLORIDE 9 MG/ML
1000 INJECTION, SOLUTION INTRAVENOUS
Refills: 0 | Status: DISCONTINUED | OUTPATIENT
Start: 2021-12-13 | End: 2021-12-14

## 2021-12-13 RX ADMIN — ENOXAPARIN SODIUM 40 MILLIGRAM(S): 100 INJECTION SUBCUTANEOUS at 11:03

## 2021-12-13 RX ADMIN — CEFEPIME 100 MILLIGRAM(S): 1 INJECTION, POWDER, FOR SOLUTION INTRAMUSCULAR; INTRAVENOUS at 06:03

## 2021-12-13 RX ADMIN — OCTREOTIDE ACETATE 50 MICROGRAM(S): 200 INJECTION, SOLUTION INTRAVENOUS; SUBCUTANEOUS at 11:05

## 2021-12-13 RX ADMIN — Medication 100 GRAM(S): at 03:41

## 2021-12-13 RX ADMIN — OCTREOTIDE ACETATE 50 MICROGRAM(S): 200 INJECTION, SOLUTION INTRAVENOUS; SUBCUTANEOUS at 05:12

## 2021-12-13 RX ADMIN — SODIUM CHLORIDE 100 MILLILITER(S): 9 INJECTION, SOLUTION INTRAVENOUS at 21:33

## 2021-12-13 RX ADMIN — Medication 100 MILLIGRAM(S): at 11:02

## 2021-12-13 RX ADMIN — CEFEPIME 100 MILLIGRAM(S): 1 INJECTION, POWDER, FOR SOLUTION INTRAMUSCULAR; INTRAVENOUS at 18:48

## 2021-12-13 RX ADMIN — Medication 250 MILLIGRAM(S): at 21:56

## 2021-12-13 RX ADMIN — OCTREOTIDE ACETATE 50 MICROGRAM(S): 200 INJECTION, SOLUTION INTRAVENOUS; SUBCUTANEOUS at 19:00

## 2021-12-13 RX ADMIN — SODIUM CHLORIDE 100 MILLILITER(S): 9 INJECTION, SOLUTION INTRAVENOUS at 11:51

## 2021-12-13 RX ADMIN — Medication 250 MILLIGRAM(S): at 09:05

## 2021-12-13 RX ADMIN — SODIUM CHLORIDE 200 MILLILITER(S): 9 INJECTION, SOLUTION INTRAVENOUS at 00:52

## 2021-12-13 NOTE — CHART NOTE - NSCHARTNOTEFT_GEN_A_CORE
ICU DOWNGRADE NOTE:    46y Female transferred to floor from ICU    Patient is a 46y old Female who presents with a chief complaint of Altered Mental Status (12 Dec 2021 21:32)    The patient is currently admitted for the primary diagnosis of Altered mental status      The patient was admitted to the unit for 1 Days.    The patient was never intubated, and was never on pressors.    Indwelling vascular catheters: Peripheral IV's     Disposition: SDU    Code Status: Full Code     ICU COURSE OF EVENTS:  -------------------------------------------------------------------------------------------  46  year old F with a PMHx of polysubstance abuse (specifically alcohol, percocet, ecstasy) presents for a 1 day history of altered mental status.  This morning, patient was endorsing having taken " cocaine.  She is unable to give me an exact amount of cocaine she snorted, but she said it was a large amount  She endorsed also a large volume of bottle of [place] dark liquor as well.  Several hours later, she was found on the toilet by a [employer] employee, unresponsive w/white powder on the table, and EMS was subsequently called.  Upon interview, the patient also endorsed crushing subuxone pills and snorting them.  She also endorsed also putting novacaine in her nostrils as her nose was burning as well.  She also endorses having multiple episodes of NBNB prior to admission. She denies having ingested toxic alcohols such as methanol, ethylene glycol or isopropyl alcohol, she also denies having a history of diabetes and having access to exogenous insulin.  She also denies opioid intake as well, despite her previous history of percocet abuse.    The patient has been also taking multiple tabs of ecstasy each day for "three years." She denies chest pain, sob, abdominal pain, fever, rigors. In the ED: CBC demonstrated demonstrated a WBC of 14K, Hgb 12.3, Initial FS was 194 but downtrended to 65, BHB .6, CMP demonstrated a Na 137, K 4.5, Cl 103, HCO3 12, AG 22, BUN 9, SCr .7, Glucose 29, Calcium 8.6, Albumin 4.2, AST//45.  Initial VBG demonstrated a 7.22/50/33 w/lactate of 4.5 which has downtrended to 3.9. UA +ve for glucose and ketones, BHB was .6, Alcohol level was .099, Tylenol and ASA levels are unremarkable. Serum osmolarity, B1, B12, B6, RPR, ESR, CRP Ammonia levels are pending.  CT A/P demonstrated proctocolitis involving the rectum, sigmoid and descending colon. Patient is s/p 2L of LR, s/p 1 dose of cefepime and vancomycin, two doses of 50mg IV D50.  Toxicology was consulted and recommended a D5 gtt and to start octreotide. Monitored overnight in MICU, pt on large volume fluids and CNS depressing agents held. Pt stable for downgrade to SDU for further monitoring and treatment.       -------------------------------------------------------------------------------------------    Current workup in progress:  Follow-up CATCH team/psych/addiction medicine   Follow-up special labs for hypoglycemia and drugs of abuse   Treat proctocolitis with antibiotics

## 2021-12-13 NOTE — CHART NOTE - NSCHARTNOTEFT_GEN_A_CORE
Attempted to evaluate patient today. Patient lethargic/sleepy, stating she is "ready to go home" and falling back asleep.    Full assessment to follow 12/14.

## 2021-12-14 DIAGNOSIS — F43.20 ADJUSTMENT DISORDER, UNSPECIFIED: ICD-10-CM

## 2021-12-14 DIAGNOSIS — F14.10 COCAINE ABUSE, UNCOMPLICATED: ICD-10-CM

## 2021-12-14 DIAGNOSIS — F11.90 OPIOID USE, UNSPECIFIED, UNCOMPLICATED: ICD-10-CM

## 2021-12-14 LAB
ALBUMIN SERPL ELPH-MCNC: 3 G/DL — LOW (ref 3.5–5.2)
ALP SERPL-CCNC: 55 U/L — SIGNIFICANT CHANGE UP (ref 30–115)
ALT FLD-CCNC: 26 U/L — SIGNIFICANT CHANGE UP (ref 0–41)
ANION GAP SERPL CALC-SCNC: 12 MMOL/L — SIGNIFICANT CHANGE UP (ref 7–14)
AST SERPL-CCNC: 13 U/L — SIGNIFICANT CHANGE UP (ref 0–41)
BASOPHILS # BLD AUTO: 0.04 K/UL — SIGNIFICANT CHANGE UP (ref 0–0.2)
BASOPHILS NFR BLD AUTO: 0.5 % — SIGNIFICANT CHANGE UP (ref 0–1)
BILIRUB SERPL-MCNC: 0.3 MG/DL — SIGNIFICANT CHANGE UP (ref 0.2–1.2)
BUN SERPL-MCNC: 10 MG/DL — SIGNIFICANT CHANGE UP (ref 10–20)
CALCIUM SERPL-MCNC: 8.2 MG/DL — LOW (ref 8.5–10.1)
CHLORIDE SERPL-SCNC: 109 MMOL/L — SIGNIFICANT CHANGE UP (ref 98–110)
CO2 SERPL-SCNC: 21 MMOL/L — SIGNIFICANT CHANGE UP (ref 17–32)
CREAT SERPL-MCNC: 0.7 MG/DL — SIGNIFICANT CHANGE UP (ref 0.7–1.5)
EOSINOPHIL # BLD AUTO: 0.2 K/UL — SIGNIFICANT CHANGE UP (ref 0–0.7)
EOSINOPHIL NFR BLD AUTO: 2.7 % — SIGNIFICANT CHANGE UP (ref 0–8)
GLUCOSE BLDC GLUCOMTR-MCNC: 110 MG/DL — HIGH (ref 70–99)
GLUCOSE BLDC GLUCOMTR-MCNC: 124 MG/DL — HIGH (ref 70–99)
GLUCOSE BLDC GLUCOMTR-MCNC: 152 MG/DL — HIGH (ref 70–99)
GLUCOSE BLDC GLUCOMTR-MCNC: 88 MG/DL — SIGNIFICANT CHANGE UP (ref 70–99)
GLUCOSE SERPL-MCNC: 112 MG/DL — HIGH (ref 70–99)
HCT VFR BLD CALC: 31.9 % — LOW (ref 37–47)
HGB BLD-MCNC: 10.6 G/DL — LOW (ref 12–16)
IMM GRANULOCYTES NFR BLD AUTO: 0.3 % — SIGNIFICANT CHANGE UP (ref 0.1–0.3)
LYMPHOCYTES # BLD AUTO: 1.01 K/UL — LOW (ref 1.2–3.4)
LYMPHOCYTES # BLD AUTO: 13.4 % — LOW (ref 20.5–51.1)
MAGNESIUM SERPL-MCNC: 1.6 MG/DL — LOW (ref 1.8–2.4)
MCHC RBC-ENTMCNC: 31 PG — SIGNIFICANT CHANGE UP (ref 27–31)
MCHC RBC-ENTMCNC: 33.2 G/DL — SIGNIFICANT CHANGE UP (ref 32–37)
MCV RBC AUTO: 93.3 FL — SIGNIFICANT CHANGE UP (ref 81–99)
MONOCYTES # BLD AUTO: 0.6 K/UL — SIGNIFICANT CHANGE UP (ref 0.1–0.6)
MONOCYTES NFR BLD AUTO: 8 % — SIGNIFICANT CHANGE UP (ref 1.7–9.3)
NEUTROPHILS # BLD AUTO: 5.66 K/UL — SIGNIFICANT CHANGE UP (ref 1.4–6.5)
NEUTROPHILS NFR BLD AUTO: 75.1 % — SIGNIFICANT CHANGE UP (ref 42.2–75.2)
NRBC # BLD: 0 /100 WBCS — SIGNIFICANT CHANGE UP (ref 0–0)
PLATELET # BLD AUTO: 262 K/UL — SIGNIFICANT CHANGE UP (ref 130–400)
POTASSIUM SERPL-MCNC: 4.3 MMOL/L — SIGNIFICANT CHANGE UP (ref 3.5–5)
POTASSIUM SERPL-SCNC: 4.3 MMOL/L — SIGNIFICANT CHANGE UP (ref 3.5–5)
PROT SERPL-MCNC: 5.5 G/DL — LOW (ref 6–8)
RBC # BLD: 3.42 M/UL — LOW (ref 4.2–5.4)
RBC # FLD: 12.9 % — SIGNIFICANT CHANGE UP (ref 11.5–14.5)
SODIUM SERPL-SCNC: 142 MMOL/L — SIGNIFICANT CHANGE UP (ref 135–146)
T PALLIDUM AB TITR SER: NEGATIVE — SIGNIFICANT CHANGE UP
WBC # BLD: 7.53 K/UL — SIGNIFICANT CHANGE UP (ref 4.8–10.8)
WBC # FLD AUTO: 7.53 K/UL — SIGNIFICANT CHANGE UP (ref 4.8–10.8)

## 2021-12-14 PROCEDURE — 99233 SBSQ HOSP IP/OBS HIGH 50: CPT

## 2021-12-14 PROCEDURE — 99222 1ST HOSP IP/OBS MODERATE 55: CPT

## 2021-12-14 PROCEDURE — 99232 SBSQ HOSP IP/OBS MODERATE 35: CPT

## 2021-12-14 RX ORDER — MAGNESIUM SULFATE 500 MG/ML
2 VIAL (ML) INJECTION ONCE
Refills: 0 | Status: COMPLETED | OUTPATIENT
Start: 2021-12-14 | End: 2021-12-14

## 2021-12-14 RX ORDER — FOLIC ACID 0.8 MG
1 TABLET ORAL DAILY
Refills: 0 | Status: DISCONTINUED | OUTPATIENT
Start: 2021-12-14 | End: 2021-12-15

## 2021-12-14 RX ORDER — BUPRENORPHINE AND NALOXONE 2; .5 MG/1; MG/1
1 TABLET SUBLINGUAL DAILY
Refills: 0 | Status: DISCONTINUED | OUTPATIENT
Start: 2021-12-14 | End: 2021-12-14

## 2021-12-14 RX ORDER — BUPRENORPHINE AND NALOXONE 2; .5 MG/1; MG/1
1 TABLET SUBLINGUAL
Refills: 0 | Status: DISCONTINUED | OUTPATIENT
Start: 2021-12-14 | End: 2021-12-15

## 2021-12-14 RX ORDER — INFLUENZA VIRUS VACCINE 15; 15; 15; 15 UG/.5ML; UG/.5ML; UG/.5ML; UG/.5ML
0.5 SUSPENSION INTRAMUSCULAR ONCE
Refills: 0 | Status: DISCONTINUED | OUTPATIENT
Start: 2021-12-14 | End: 2021-12-15

## 2021-12-14 RX ADMIN — Medication 100 MILLIGRAM(S): at 12:27

## 2021-12-14 RX ADMIN — BUPRENORPHINE AND NALOXONE 1 TABLET(S): 2; .5 TABLET SUBLINGUAL at 17:44

## 2021-12-14 RX ADMIN — Medication 1 MILLIGRAM(S): at 12:35

## 2021-12-14 RX ADMIN — BUPRENORPHINE AND NALOXONE 1 TABLET(S): 2; .5 TABLET SUBLINGUAL at 12:27

## 2021-12-14 RX ADMIN — OCTREOTIDE ACETATE 50 MICROGRAM(S): 200 INJECTION, SOLUTION INTRAVENOUS; SUBCUTANEOUS at 00:53

## 2021-12-14 RX ADMIN — ENOXAPARIN SODIUM 40 MILLIGRAM(S): 100 INJECTION SUBCUTANEOUS at 12:27

## 2021-12-14 RX ADMIN — OCTREOTIDE ACETATE 50 MICROGRAM(S): 200 INJECTION, SOLUTION INTRAVENOUS; SUBCUTANEOUS at 06:23

## 2021-12-14 RX ADMIN — SODIUM CHLORIDE 100 MILLILITER(S): 9 INJECTION, SOLUTION INTRAVENOUS at 10:10

## 2021-12-14 RX ADMIN — Medication 1 TABLET(S): at 12:27

## 2021-12-14 RX ADMIN — Medication 25 GRAM(S): at 10:09

## 2021-12-14 RX ADMIN — CEFEPIME 100 MILLIGRAM(S): 1 INJECTION, POWDER, FOR SOLUTION INTRAMUSCULAR; INTRAVENOUS at 06:23

## 2021-12-14 NOTE — BEHAVIORAL HEALTH ASSESSMENT NOTE - COMMENTS ON VIOLENCE RISK/PROTECTIVE FACTORS:
risk factor of substance use mitigated by residential stability, affective stability and lack of violent hx

## 2021-12-14 NOTE — BEHAVIORAL HEALTH ASSESSMENT NOTE - RISK ASSESSMENT
Low Acute Suicide Risk Patient risk factors or stress and substance use are mitigated by the patients future orientation, help seeking, self advocacy, strong support system and denial of suicidal ideations.

## 2021-12-14 NOTE — PROGRESS NOTE ADULT - SUBJECTIVE AND OBJECTIVE BOX
----------Daily Progress Note----------    HISTORY OF PRESENT ILLNESS:  Patient is a 46y old Female who presents with a chief complaint of Altered Mental Status (14 Dec 2021 08:50)    Currently admitted to medicine with the primary diagnosis of Altered mental status       Today is hospital day 2d.     INTERVAL HOSPITAL COURSE / OVERNIGHT EVENTS:    Patient was examined and seen at bedside. This morning she is resting comfortably in bed and reports no new issues or overnight events.     Review of Systems: Otherwise unremarkable     <<<<<PAST MEDICAL & SURGICAL HISTORY>>>>>  No pertinent past medical history      ALLERGIES  No Known Allergies      Home Medications:        MEDICATIONS  STANDING MEDICATIONS  buprenorphine 2 mG/naloxone 0.5 mG SL  Tablet 1 Tablet(s) SubLingual daily  enoxaparin Injectable 40 milliGRAM(s) SubCutaneous daily  folic acid 1 milliGRAM(s) Oral daily  lactated ringers. 1000 milliLiter(s) IV Continuous <Continuous>  multivitamin 1 Tablet(s) Oral daily  thiamine Injectable 100 milliGRAM(s) IV Push daily    PRN MEDICATIONS  LORazepam     Tablet 2 milliGRAM(s) Oral every 2 hours PRN    VITALS:  T(F): 98.1  HR: 62  BP: 156/72  RR: 16  SpO2: 100%    <<<<<LABS>>>>>                        10.6   7.53  )-----------( 262      ( 14 Dec 2021 05:53 )             31.9     -    142  |  109  |  10  ----------------------------<  112<H>  4.3   |  21  |  0.7    Ca    8.2<L>      14 Dec 2021 05:53  Phos  3.4     12-  Mg     1.6         TPro  5.5<L>  /  Alb  3.0<L>  /  TBili  0.3  /  DBili  x   /  AST  13  /  ALT  26  /  AlkPhos  55        Urinalysis Basic - ( 12 Dec 2021 16:42 )    Color: Light Yellow / Appearance: Clear / S.015 / pH: x  Gluc: x / Ketone: Moderate  / Bili: Negative / Urobili: <2 mg/dL   Blood: x / Protein: Trace / Nitrite: Negative   Leuk Esterase: Negative / RBC: x / WBC x   Sq Epi: x / Non Sq Epi: x / Bacteria: x            Culture - Blood (collected 12 Dec 2021 17:17)  Source: .Blood Blood  Preliminary Report (13 Dec 2021 23:02):    No growth to date.    Culture - Blood (collected 12 Dec 2021 17:01)  Source: .Blood Blood  Preliminary Report (13 Dec 2021 23:02):    No growth to date.    [id number]  CARDIAC MARKERS ( 12 Dec 2021 23:14 )  x     / x     / 233 U/L / x     / x            <<<<<RADIOLOGY>>>>>    < from: CT Abdomen and Pelvis w/ IV Cont (21 @ 19:54) >    INTERPRETATION:  CLINICAL STATEMENT: Abdominal pain    TECHNIQUE: Contiguous axial CT images were obtained from the lower chest   to the pubic symphysis following administration of intravenous contrast.    Oral contrast was not administered.  Reformatted images in the coronal   and sagittal planes were acquired.    COMPARISON/CORRELATION: None.      FINDINGS:    LOWER CHEST: Unremarkable.    HEPATOBILIARY: Subcentimeter hypodensities too small to fully   characterize.    SPLEEN: Unremarkable.    PANCREAS: Unremarkable.    ADRENAL GLANDS: Unremarkable.    KIDNEYS: Symmetric renal enhancement. No hydronephrosis.    ABDOMINOPELVIC NODES:Unremarkable.    PELVIC ORGANS: Unremarkable.    PERITONEUM/MESENTERY/BOWEL: Circumferential wall thickening and mucosal   enhancement despite underdistention involving the descending colon,   sigmoid and rectum, compatible with proctocolitis in the appropriate   clinical setting. No evidence of bowel obstruction, pneumoperitoneum or   ascites. The appendix appears normal. The SMV and SMA are patent    BONES/SOFT TISSUES: Unremarkable.      IMPRESSION:    Findings compatible with proctocolitis involving the rectum, sigmoid and   descending colon, given the appropriate clinical setting.    < end of copied text >    < from: CT Head No Cont (21 @ 14:53) >    PROCEDURE DATE:  2021          INTERPRETATION:  Clinical History / Reason for exam: Altered mental   status.    TECHNIQUE: Contiguous axial CT images were obtained from the base of the   skull to the vertex without administration of intravenous contrast.   Coronal and sagittal reformatted images were constructed.    COMPARISON: None available.      FINDINGS:    There is a smooth-walled inner table calvarial concavity measuring 2.2 x   2.9 cm along the right frontal calvarium (2-21) with prominent   extra-axial space which could reflect an incidental arachnoid cyst. Faint   hyperattenuation may be artifactual from adjacent streak artifact.    The ventricles and cortical sulci are appropriate for the patient's   stated age.    There is no evidence for acute territorial infarct, intracranial   hemorrhage, or midline shift.    No depressed skull fracture. Mild inflammatory mucosal thickening of the   right maxillary sinus. The remaining visualized paranasal sinuses and   mastoids are well-aerated.      IMPRESSION:    No acute intracranial pathology.    Smooth-walled inner table concavity along the right frontal calvarium   likely representing an incidental arachnoid cyst.    < end of copied text >      <<<<<PHYSICAL EXAM>>>>>  GENERAL: No acute distress, resting comfortably in bed  PULMONARY: Clear to auscultation bilaterally. No rales, rhonchi, or wheezing.  CARDIOVASCULAR: Regular rate and rhythm, S1-S2, no murmurs  GASTROINTESTINAL: Soft, non-tender, non-distended, no guarding.  SKIN/EXTREMITIES: No LE edema b/l  NEUROLOGIC: AAOX3      -----------------------------------------------------------------------------------------------------------------------------------------------------------------------------------------------

## 2021-12-14 NOTE — BEHAVIORAL HEALTH ASSESSMENT NOTE - SUMMARY
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 Patient admitted for management of AMS. psychiatry was consulted for evaluation of suicidality.     Encounter found patient to be calm, cooperative, no acute distress, relatable, reporting numerous stressors in her life which lead her to wish to relax privately by drinking alcohol and using cocaine. Patient reported she was notably inebriated from alcohol which lead to her altered mental status but she adamantly denied suicidal intent. Her demonstration of self preservative behaviors including self grooming and self advocacy for help with housing further indicate low suicide risk. While the patient would not benefit from ipp, she would benefit from therapy to help her cope with her stressors and she would further benefit from establishing care with a suboxone provider instead of buying suboxone from illicit sources.     plan:  - No ipp  - repeat EKG   - if ekg qTC < 500 may start Suboxone 2/0.5 BID with the first dose being administered now  - Behavior health team will follow for patient's tolerance to Suboxone  - Patient would benefit from supportive psychotherapy on an outpatient basis   - No psychiatric contraindications to discharge  - Patient may follow up with :    [facility]  [address] [place] [address]   [id number]  [phone]

## 2021-12-14 NOTE — BEHAVIORAL HEALTH ASSESSMENT NOTE - COMMENTS ON SUICIDE RISK/PROTECTIVE FACTORS:
Patient risk factors or stress and substance use are mitigated by the patients future orientation, help seeking, self advocacy, strong support system and denial of suicidal ideations.

## 2021-12-14 NOTE — PROGRESS NOTE ADULT - SUBJECTIVE AND OBJECTIVE BOX
Patient is a 46y old  Female who presents with a chief complaint of Altered Mental Status (12 Dec 2021 21:32)        Over Night Events:  On RA.  Off pressors.  Improved MS         ROS:     All ROS are negative except HPI         PHYSICAL EXAM    ICU Vital Signs Last 24 Hrs  T(C): 36.8 (14 Dec 2021 08:00), Max: 37.1 (13 Dec 2021 16:41)  T(F): 98.2 (14 Dec 2021 08:00), Max: 98.8 (13 Dec 2021 16:41)  HR: 74 (14 Dec 2021 08:00) (56 - 75)  BP: 131/81 (14 Dec 2021 08:00) (109/54 - 137/82)  BP(mean): 99 (14 Dec 2021 08:00) (78 - 99)  ABP: --  ABP(mean): --  RR: 16 (14 Dec 2021 08:00) (14 - 24)  SpO2: 100% (14 Dec 2021 08:00) (98% - 100%)      CONSTITUTIONAL:  Well nourished.  NAD    ENT:   Airway patent,   Mouth with normal mucosa.   No thrush    EYES:   Pupils equal,   Round and reactive to light.    CARDIAC:   Normal rate,   Regular rhythm.    No edema      Vascular:  Normal systolic impulse  No Carotid bruits    RESPIRATORY:   No wheezing  Bilateral BS  Normal chest expansion  Not tachypneic,  No use of accessory muscles    GASTROINTESTINAL:  Abdomen soft,   Non-tender,   No guarding,   + BS    MUSCULOSKELETAL:   Range of motion is not limited,  No clubbing, cyanosis    NEUROLOGICAL:   Alert and oriented   No motor  deficits.    SKIN:   Skin normal color for race,   Warm and dry and intact.   No evidence of rash.    PSYCHIATRIC:   Normal mood and affect.   No apparent risk to self or others.    HEMATOLOGICAL:  No cervical  lymphadenopathy.  no inguinal lymphadenopathy      21 @ 07:01  -  21 @ 07:00  --------------------------------------------------------  IN:    IV PiggyBack: 250 mL    Lactated Ringers: 1300 mL  Total IN: 1550 mL    OUT:    Stool (mL): 2 mL    Voided (mL): 400 mL  Total OUT: 402 mL    Total NET: 1148 mL          LABS:                            10.6   7.53  )-----------( 262      ( 14 Dec 2021 05:53 )             31.9                                                   142  |  109  |  10  ----------------------------<  112<H>  4.3   |  21  |  0.7    Ca    8.2<L>      14 Dec 2021 05:53  Phos  3.4     12  Mg     1.6         TPro  5.5<L>  /  Alb  3.0<L>  /  TBili  0.3  /  DBili  x   /  AST  13  /  ALT  26  /  AlkPhos  55                                               Urinalysis Basic - ( 12 Dec 2021 16:42 )    Color: Light Yellow / Appearance: Clear / S.015 / pH: x  Gluc: x / Ketone: Moderate  / Bili: Negative / Urobili: <2 mg/dL   Blood: x / Protein: Trace / Nitrite: Negative   Leuk Esterase: Negative / RBC: x / WBC x   Sq Epi: x / Non Sq Epi: x / Bacteria: x        CARDIAC MARKERS ( 12 Dec 2021 23:14 )  x     / x     / 233 U/L / x     / x                                                LIVER FUNCTIONS - ( 14 Dec 2021 05:53 )  Alb: 3.0 g/dL / Pro: 5.5 g/dL / ALK PHOS: 55 U/L / ALT: 26 U/L / AST: 13 U/L / GGT: x                                                  Culture - Blood (collected 12 Dec 2021 17:17)  Source: .Blood Blood  Preliminary Report (13 Dec 2021 23:02):    No growth to date.    Culture - Blood (collected 12 Dec 2021 17:01)  Source: .Blood Blood  Preliminary Report (13 Dec 2021 23:02):    No growth to date.                                                                                           MEDICATIONS  (STANDING):  cefepime   IVPB 2000 milliGRAM(s) IV Intermittent every 12 hours  enoxaparin Injectable 40 milliGRAM(s) SubCutaneous daily  lactated ringers. 1000 milliLiter(s) (100 mL/Hr) IV Continuous <Continuous>  octreotide  Injectable 50 MICROGram(s) IV Push every 6 hours  thiamine Injectable 100 milliGRAM(s) IV Push daily  vancomycin  IVPB 750 milliGRAM(s) IV Intermittent every 12 hours    MEDICATIONS  (PRN):      New X-rays reviewed:                                                                                  ECHO    CXR interpreted by me:

## 2021-12-14 NOTE — CONSULT NOTE ADULT - SUBJECTIVE AND OBJECTIVE BOX
MEDICAL TOXICOLOGY CONSULT    HPI:   45 yo BIBEMS for AMS. Pt was reportedly found only responsive to painful stimuli at [facility] with unidentified white power and empty bottle of congac. Per EMS, blood glucose was 28 when they arrived on scene. Pt endorse using cocaine, ETOH and Suboxone tonight.     In he ED, pt is protecting airway, awakes to noxious stimuli, was hypothermic to 94F, 2mm PERRL, moist mucous membranes, dry skin, sedative hypnotic toxidrome on exam. Patient was noted to have another hypoglycemic even to 65. Tox consulted for persistent hypoglycemia.     ONSET / TIME of exposure(s): unkown  QUANTITY of exposure(s): unknown  ROUTE of exposure:  unknown  CONTEXT of exposure: unknown  ASSOCIATED symptoms: AMS, hypoglycemia.     PAST MEDICAL & SURGICAL HISTORY:  No pertinent past medical history    MEDICATION HISTORY:  dextrose 5%. 1000 milliLiter(s) IV Continuous <Continuous>  folic acid Injectable 1 milliGRAM(s) IV Push Once  octreotide  Injectable 50 MICROGram(s) IV Push every 6 hours    PHYSICAL EXAM  Vital Signs Last 24 Hrs  T(C): 37.1 (12 Dec 2021 20:20), Max: 37.1 (12 Dec 2021 20:20)  T(F): 98.8 (12 Dec 2021 20:20), Max: 98.8 (12 Dec 2021 20:20)  HR: 78 (12 Dec 2021 20:28) (58 - 78)  BP: 139/79 (12 Dec 2021 20:28) (136/75 - 168/87)  BP(mean): --  RR: 18 (12 Dec 2021 20:28) (18 - 19)  SpO2: 100% (12 Dec 2021 20:28) (99% - 100%)    SIGNIFICANT LABORATORY STUDIES:                        12.3   14.14 )-----------( 312      ( 12 Dec 2021 14:41 )             38.8       12-12    137  |  103  |  9<L>  ----------------------------<  29<LL>  4.5   |  12<L>  |  0.7    Ca    8.6      12 Dec 2021 14:41    TPro  7.7  /  Alb  4.2  /  TBili  0.8  /  DBili  x   /  AST  109<H>  /  ALT  45<H>  /  AlkPhos  70  12-12    16:46 - VBG - pH: 7.19  | pCO2: 50    | pO2: 37    | Lactate: 3.90   14:27 - VBG - pH: 7.22  | pCO2: 50    | pO2: 33    | Lactate: 4.50     Urinalysis Basic - ( 12 Dec 2021 16:42 )    Color: Light Yellow / Appearance: Clear / S.015 / pH: x  Gluc: x / Ketone: Moderate  / Bili: Negative / Urobili: <2 mg/dL   Blood: x / Protein: Trace / Nitrite: Negative   Leuk Esterase: Negative / RBC: x / WBC x   Sq Epi: x / Non Sq Epi: x / Bacteria: x    Anion Gap: 22<H>  14:41  CK: --  @ 14:41  Troponin:  --   @ 14:41  Pro-BNP:  --   @ 14:41  VBG:  --   14:41  Carboxyhemoglobin %:  --   14:41  Methemoglobin %:  --   14:41  Osmolality Serum:  --   14:41  Aspirin Level: 0.4<L>   14:41  Acetaminophen Level:  <5.0<L>   14:41  Ethanol Level:  --   14:41  Digoxin Level:  --   14:41  Phenytoin Level:  --   14:41  Carbamazepine level:  --   14:41  Lamotrigine level:  --   14:41    
Please see behavioral health note for addiction medicine recommendations

## 2021-12-14 NOTE — BEHAVIORAL HEALTH ASSESSMENT NOTE - SUICIDE PROTECTIVE FACTORS
Responsibility to family and others/Identifies reasons for living/Has future plans/Supportive social network of family or friends/Cultural, spiritual and/or moral attitudes against suicide/Ability to cope with stress/Frustration tolerance

## 2021-12-14 NOTE — BEHAVIORAL HEALTH ASSESSMENT NOTE - NSBHCONSULTSUBSTANCEOPIATES_PSY_A_CORE FT
Suboxone 2/ 0.5 sublingual BID Advancement-Rotation Flap Text: The defect edges were debeveled with a #15 scalpel blade.  Given the location of the defect, shape of the defect and the proximity to free margins an advancement-rotation flap was deemed most appropriate.  Using a sterile surgical marker, an appropriate flap was drawn incorporating the defect and placing the expected incisions within the relaxed skin tension lines where possible. The area thus outlined was incised deep to adipose tissue with a #15 scalpel blade.  The skin margins were undermined to an appropriate distance in all directions utilizing iris scissors.

## 2021-12-14 NOTE — BEHAVIORAL HEALTH ASSESSMENT NOTE - NSBHCHARTREVIEWVS_PSY_A_CORE FT
Vital Signs Last 24 Hrs  T(C): 36.7 (14 Dec 2021 12:00), Max: 37.1 (13 Dec 2021 16:41)  T(F): 98.1 (14 Dec 2021 12:00), Max: 98.8 (13 Dec 2021 16:41)  HR: 62 (14 Dec 2021 12:00) (62 - 75)  BP: 156/72 (14 Dec 2021 12:00) (109/54 - 156/72)  BP(mean): 103 (14 Dec 2021 12:00) (78 - 103)  RR: 16 (14 Dec 2021 12:00) (14 - 24)  SpO2: 100% (14 Dec 2021 12:00) (99% - 100%)

## 2021-12-14 NOTE — PATIENT PROFILE ADULT - FALL HARM RISK - HARM RISK INTERVENTIONS
Assistance with ambulation/Assistance OOB with selected safe patient handling equipment/Communicate Risk of Fall with Harm to all staff/Monitor for mental status changes/Monitor gait and stability/Reinforce activity limits and safety measures with patient and family/Tailored Fall Risk Interventions/Toileting schedule using arm’s reach rule for commode and bathroom/Use of alarms - bed, chair and/or voice tab/Visual Cue: Yellow wristband and red socks/Bed in lowest position, wheels locked, appropriate side rails in place/Call bell, personal items and telephone in reach/Instruct patient to call for assistance before getting out of bed or chair/Non-slip footwear when patient is out of bed/[place] to call system/Physically safe environment - no spills, clutter or unnecessary equipment/Purposeful Proactive Rounding/Room/bathroom lighting operational, light cord in reach

## 2021-12-14 NOTE — BEHAVIORAL HEALTH ASSESSMENT NOTE - DESCRIPTION (FIRST USE, LAST USE, QUANTITY, FREQUENCY, DURATION)
reports previously up to 30mg oxycodone, reports not on 1/7 of 8mg suboxone 1-2 times per day 1ppd sober 1 year, 9 alcohol shots prior to presentation 1g per day via snorting

## 2021-12-14 NOTE — BEHAVIORAL HEALTH ASSESSMENT NOTE - NSBHCHARTREVIEWLAB_PSY_A_CORE FT
10.6   7.53  )-----------( 262      ( 14 Dec 2021 05:53 )             31.9   12-14    142  |  109  |  10  ----------------------------<  112<H>  4.3   |  21  |  0.7    Ca    8.2<L>      14 Dec 2021 05:53  Phos  3.4     12-13  Mg     1.6     12-14    TPro  5.5<L>  /  Alb  3.0<L>  /  TBili  0.3  /  DBili  x   /  AST  13  /  ALT  26  /  AlkPhos  55  12-14

## 2021-12-14 NOTE — PATIENT PROFILE ADULT - HOME ACCESSIBILITY CONCERNS
none
Normal contour/Liver palpable < 2 cm below rib margin with sharp edge/Umbilicus with 3 vessels, normal color size and texture/Abdominal distention and masses absent/Adequate bowel sound pattern for age/Spleen tip absend or slightly below rib margin/Abdominal wall defects absent

## 2021-12-14 NOTE — BEHAVIORAL HEALTH ASSESSMENT NOTE - NSBHCHARTREVIEWINVESTIGATE_PSY_A_CORE FT
< from: 12 Lead ECG (12.12.21 @ 18:22) >      QTC Calculation(Bazett) 517 ms    P Axis 64 degrees    R Axis 57 degrees    T Axis 27 degrees    Diagnosis Line Normal sinus rhythm  Septal infarct , age undetermined  T wave abnormality, consider anterior ischemia  Prolonged QT  Abnormal ECG    Confirmed by Sonia Stone MD ([id number]) on 12/13/2021 8:54:19 AM    < end of copied text >
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
## 2021-12-14 NOTE — BEHAVIORAL HEALTH ASSESSMENT NOTE - NSBHCHARTREVIEWIMAGING_PSY_A_CORE FT
< from: CT Abdomen and Pelvis w/ IV Cont (12.12.21 @ 19:54) >    IMPRESSION:    Findings compatible with proctocolitis involving the rectum, sigmoid and   descending colon, given the appropriate clinical setting.    --- End of Report ---    < from: CT Head No Cont (12.12.21 @ 14:53) >      IMPRESSION:    No acute intracranial pathology.    Smooth-walled inner table concavity along the right frontal calvarium   likely representing an incidental arachnoid cyst.    --- End of Report ---      < end of copied text >

## 2021-12-14 NOTE — PROGRESS NOTE ADULT - TIME BILLING
#Altered mental status/ toxic metabolic encephalopathy  in setting of cocaine, suboxone, novocaine, alcohol abuse  with hypoglycemia on admission, now resolved s/p octreotide  mental status resolved  cth neg, incidentally noted arachnoid cyst  has been using Suboxone without prescription  d/w psych, start suboxone now, plan for d/c tm  PT    #Progress Note Handoff:  Pending (specify):  Consults_________, Tests________, Test Results_______, Other___trial suboxone______  Family discussion: d/w pt at bedside re: treatment plan, primary dx  Disposition: Home___/SNF___/Other________/Unknown at this time____x____ #Altered mental status/ toxic metabolic encephalopathy  in setting of cocaine, suboxone, novocaine, alcohol abuse  with hypoglycemia on admission, now resolved s/p octreotide  mental status resolved  cth neg, incidentally noted arachnoid cyst  has been using Suboxone without prescription  d/w psych, start suboxone now, plan for d/c tm  PT  #Proctocolitis  incidentally noted on ct  asymptomatic, monitor off abx      #Progress Note Handoff:  Pending (specify):  Consults_________, Tests________, Test Results_______, Other___trial suboxone______  Family discussion: d/w pt at bedside re: treatment plan, primary dx  Disposition: Home___/SNF___/Other________/Unknown at this time____x____ #Altered mental status/ toxic metabolic encephalopathy  in setting of cocaine, suboxone, novocaine, alcohol abuse  with hypoglycemia on admission, now resolved s/p octreotide  mental status resolved  cth neg, incidentally noted arachnoid cyst  has been using Suboxone without prescription  d/w psych, start suboxone now, plan for d/c tm  PT  #Proctocolitis  incidentally noted on ct  asymptomatic, monitor off abx  #Thiamine def, suspected  replete      #Progress Note Handoff:  Pending (specify):  Consults_________, Tests________, Test Results_______, Other___trial suboxone______  Family discussion: d/w pt at bedside re: treatment plan, primary dx  Disposition: Home___/SNF___/Other________/Unknown at this time____x____
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
## 2021-12-14 NOTE — BEHAVIORAL HEALTH ASSESSMENT NOTE - CASE SUMMARY
Ms Field is a 46 year old woman with a history of Opioid and Cocaine Use disorder who was admitted to the ICU service  for altered mental status , following an overdose illicit drugs and alcohol. Patient has since been downgraded to the medical floor. Psychiatry consult was called for the evaluation of possible suicidal ideations as patient was said to have informed  ICU staff that she was having thoughts of wanting to end her life. Patient denies having current suicidal ideations , intent or plans. She denies having past suicide attempts or any primary psychiatric illness except for illicit substance use. She seemed quite surprised that she would make a suicidal statement but attributes it to being intoxicated . Aside from her illicit substance use and  her current social stressors , patient appears to be a low risk for suicide. She seems to have depressed mood and anxiety in the context of her social stressors particularly her housing situation which seems to cause her significant frustration . Patient appears to chronically use cocaine but it seems that the alcohol use on the day of presentation was a none time episode. It does not appear that there is a high risk for alcohol withdrawals. Patient however uses Suboxone from the street for the self management of opioid use disorder given her past dependence to opoid pain medication.   For now, patient does not appear to have acute symptoms of psychosis, dc or depression.   At this time, patient is not considered an imminent danger to herself or others and does not need inpatient psychiatric hospitalization. Patient will benefit from supportive psychotherapy to help her develop better coping skills and better frustration tolerance to address her stressors in addition to substance abuse treatment to archive and maintain sobriety from cocaine . With regards to the Suboxone use , patient is agreeable to follow up with an addiction psychiatrist for the management of opioid use disorder  with Suboxone as a means of harm reducing treatment instead of getting the medication from the street. Patient was informed about the risks of diverting this medication on outpatient basis and she verbalized understanding. Patient can be started on Suboxone 2/0.5mg SL BID for opioid use disorder . Addiction psychiatry team will follow to observe for tolerance of the medication.   Upon discharge, we recommend referring patient to [facility], [address] [place] [address], and Phone number: [phone]

## 2021-12-15 ENCOUNTER — TRANSCRIPTION ENCOUNTER (OUTPATIENT)
Age: 46
End: 2021-12-15

## 2021-12-15 VITALS
OXYGEN SATURATION: 96 % | RESPIRATION RATE: 18 BRPM | SYSTOLIC BLOOD PRESSURE: 131 MMHG | HEART RATE: 56 BPM | TEMPERATURE: 97 F | DIASTOLIC BLOOD PRESSURE: 86 MMHG

## 2021-12-15 LAB
ALBUMIN SERPL ELPH-MCNC: 3.2 G/DL — LOW (ref 3.5–5.2)
ALP SERPL-CCNC: 56 U/L — SIGNIFICANT CHANGE UP (ref 30–115)
ALT FLD-CCNC: 24 U/L — SIGNIFICANT CHANGE UP (ref 0–41)
ANION GAP SERPL CALC-SCNC: 11 MMOL/L — SIGNIFICANT CHANGE UP (ref 7–14)
AST SERPL-CCNC: 14 U/L — SIGNIFICANT CHANGE UP (ref 0–41)
BILIRUB SERPL-MCNC: 0.4 MG/DL — SIGNIFICANT CHANGE UP (ref 0.2–1.2)
BUN SERPL-MCNC: 9 MG/DL — LOW (ref 10–20)
CALCIUM SERPL-MCNC: 8.4 MG/DL — LOW (ref 8.5–10.1)
CHLORIDE SERPL-SCNC: 107 MMOL/L — SIGNIFICANT CHANGE UP (ref 98–110)
CO2 SERPL-SCNC: 21 MMOL/L — SIGNIFICANT CHANGE UP (ref 17–32)
CREAT SERPL-MCNC: 0.7 MG/DL — SIGNIFICANT CHANGE UP (ref 0.7–1.5)
GLUCOSE BLDC GLUCOMTR-MCNC: 86 MG/DL — SIGNIFICANT CHANGE UP (ref 70–99)
GLUCOSE BLDC GLUCOMTR-MCNC: 96 MG/DL — SIGNIFICANT CHANGE UP (ref 70–99)
GLUCOSE SERPL-MCNC: 93 MG/DL — SIGNIFICANT CHANGE UP (ref 70–99)
HCT VFR BLD CALC: 33.4 % — LOW (ref 37–47)
HGB BLD-MCNC: 10.6 G/DL — LOW (ref 12–16)
MAGNESIUM SERPL-MCNC: 1.7 MG/DL — LOW (ref 1.8–2.4)
MCHC RBC-ENTMCNC: 30.3 PG — SIGNIFICANT CHANGE UP (ref 27–31)
MCHC RBC-ENTMCNC: 31.7 G/DL — LOW (ref 32–37)
MCV RBC AUTO: 95.4 FL — SIGNIFICANT CHANGE UP (ref 81–99)
NRBC # BLD: 0 /100 WBCS — SIGNIFICANT CHANGE UP (ref 0–0)
PLATELET # BLD AUTO: 264 K/UL — SIGNIFICANT CHANGE UP (ref 130–400)
POTASSIUM SERPL-MCNC: 4 MMOL/L — SIGNIFICANT CHANGE UP (ref 3.5–5)
POTASSIUM SERPL-SCNC: 4 MMOL/L — SIGNIFICANT CHANGE UP (ref 3.5–5)
PROT SERPL-MCNC: 5.7 G/DL — LOW (ref 6–8)
RBC # BLD: 3.5 M/UL — LOW (ref 4.2–5.4)
RBC # FLD: 13.1 % — SIGNIFICANT CHANGE UP (ref 11.5–14.5)
SODIUM SERPL-SCNC: 139 MMOL/L — SIGNIFICANT CHANGE UP (ref 135–146)
WBC # BLD: 6.42 K/UL — SIGNIFICANT CHANGE UP (ref 4.8–10.8)
WBC # FLD AUTO: 6.42 K/UL — SIGNIFICANT CHANGE UP (ref 4.8–10.8)

## 2021-12-15 PROCEDURE — 99239 HOSP IP/OBS DSCHRG MGMT >30: CPT

## 2021-12-15 PROCEDURE — 99232 SBSQ HOSP IP/OBS MODERATE 35: CPT

## 2021-12-15 RX ORDER — MAGNESIUM SULFATE 500 MG/ML
2 VIAL (ML) INJECTION ONCE
Refills: 0 | Status: COMPLETED | OUTPATIENT
Start: 2021-12-15 | End: 2021-12-15

## 2021-12-15 RX ORDER — BUPRENORPHINE AND NALOXONE 2; .5 MG/1; MG/1
1 TABLET SUBLINGUAL
Qty: 14 | Refills: 0
Start: 2021-12-15 | End: 2021-12-21

## 2021-12-15 RX ADMIN — Medication 25 GRAM(S): at 11:30

## 2021-12-15 RX ADMIN — ENOXAPARIN SODIUM 40 MILLIGRAM(S): 100 INJECTION SUBCUTANEOUS at 11:29

## 2021-12-15 RX ADMIN — Medication 1 TABLET(S): at 11:30

## 2021-12-15 RX ADMIN — Medication 100 MILLIGRAM(S): at 11:30

## 2021-12-15 RX ADMIN — BUPRENORPHINE AND NALOXONE 1 TABLET(S): 2; .5 TABLET SUBLINGUAL at 08:19

## 2021-12-15 RX ADMIN — Medication 1 MILLIGRAM(S): at 11:29

## 2021-12-15 NOTE — DISCHARGE NOTE NURSING/CASE MANAGEMENT/SOCIAL WORK - NSDCPEEMAIL_GEN_ALL_CORE
[facility] for Tobacco Control email [email or web address]

## 2021-12-15 NOTE — DISCHARGE NOTE PROVIDER - NSDCCPCAREPLAN_GEN_ALL_CORE_FT
PRINCIPAL DISCHARGE DIAGNOSIS  Diagnosis: Altered mental status  Assessment and Plan of Treatment: When you were brought to the hospital, you were noted to have a change in your mental status. These can usually be caused by different factors, however in your case it was due to drug intoxication and you were started on medication, suboxone, to help with withdrawal. A prescription for this medication was sent to your pharmacy and an appointment with an outpatient clinic to help with this issue was arranged. Please take your medication as directed and followup in the Dual Diagnosis outpatient clinic for further care and managment.   SEEK IMMEDIATE MEDICAL CARE IF YOU HAVE ANY OF THE FOLLOWING SYMPTOMS: chest pain, shortness of breath, abdominal pain, sweating, vomiting, blood in vomit/bowel movements/urine, dizziness/lightheadedness, weakness or numbness to face/arm/leg, trouble speaking or understanding, facial droop.        SECONDARY DISCHARGE DIAGNOSES  Diagnosis: Polysubstance abuse  Assessment and Plan of Treatment: .Polysubstance abuse is abuse of certain drugs and/or medications. It is important to followup with the Dual Diagnosis for further care and managment. Please also take your suboxone as directed.   SEEK IMMEDIATE MEDICAL CARE IF YOU HAVE ANY OF THE FOLLOWING SYMPTOMS: chest pain, shortness of breath, abdominal pain, sweating, vomiting, blood in vomit/bowel movements/urine, dizziness/lightheadedness, weakness or numbness to face/arm/leg, trouble speaking or understanding, facial droop.

## 2021-12-15 NOTE — DISCHARGE NOTE PROVIDER - NSDCMRMEDTOKEN_GEN_ALL_CORE_FT
buprenorphine-naloxone 2 mg-0.5 mg sublingual tablet: 1 strip(s) sublingual 2 times a day MDD:4/1mg

## 2021-12-15 NOTE — PROGRESS NOTE ADULT - SUBJECTIVE AND OBJECTIVE BOX
Patient is a 46y old  Female who presents with a chief complaint of Altered Mental Status (14 Dec 2021 14:29)        Over Night Events:  no acute events overnight  psych eval appreciated  currently menstruating        ROS:     All ROS are negative except HPI         PHYSICAL EXAM    ICU Vital Signs Last 24 Hrs  T(C): 36.2 (15 Dec 2021 08:00), Max: 36.7 (14 Dec 2021 12:00)  T(F): 97.1 (15 Dec 2021 08:00), Max: 98.1 (14 Dec 2021 12:00)  HR: 75 (15 Dec 2021 08:00) (53 - 75)  BP: 134/63 (15 Dec 2021 08:00) (112/57 - 156/72)  BP(mean): 91 (15 Dec 2021 08:00) (82 - 103)  ABP: --  ABP(mean): --  RR: 18 (15 Dec 2021 08:00) (12 - 22)  SpO2: 96% (15 Dec 2021 08:00) (96% - 100%)      CONSTITUTIONAL:  Well nourished.  NAD    ENT:   Airway patent,   Mouth with normal mucosa.   No thrush    EYES:   Pupils equal,   Round and reactive to light.    CARDIAC:   Normal rate,   Regular rhythm.    No edema      Vascular:  Normal systolic impulse  No Carotid bruits    RESPIRATORY:   No wheezing  Bilateral BS  Normal chest expansion  Not tachypneic,  No use of accessory muscles    GASTROINTESTINAL:  Abdomen soft,   Non-tender,   No guarding,   + BS    MUSCULOSKELETAL:   Range of motion is not limited,  No clubbing, cyanosis    NEUROLOGICAL:   Alert and oriented   No motor  deficits.    SKIN:   Skin normal color for race,   Warm and dry and intact.   No evidence of rash.    PSYCHIATRIC:   Normal mood and affect.   No apparent risk to self or others.    HEMATOLOGICAL:  No cervical  lymphadenopathy.  no inguinal lymphadenopathy      12-14-21 @ 07:01  -  12-15-21 @ 07:00  --------------------------------------------------------  IN:    Lactated Ringers: 600 mL    Oral Fluid: 600 mL  Total IN: 1200 mL    OUT:    Voided (mL): 700 mL  Total OUT: 700 mL    Total NET: 500 mL          LABS:                            10.6   6.42  )-----------( 264      ( 15 Dec 2021 06:00 )             33.4                                               12-15    139  |  107  |  9<L>  ----------------------------<  93  4.0   |  21  |  0.7    Ca    8.4<L>      15 Dec 2021 06:00  Mg     1.7     12-15    TPro  5.7<L>  /  Alb  3.2<L>  /  TBili  0.4  /  DBili  x   /  AST  14  /  ALT  24  /  AlkPhos  56  12-15                                                                                           LIVER FUNCTIONS - ( 15 Dec 2021 06:00 )  Alb: 3.2 g/dL / Pro: 5.7 g/dL / ALK PHOS: 56 U/L / ALT: 24 U/L / AST: 14 U/L / GGT: x                                                  Culture - Blood (collected 12 Dec 2021 17:17)  Source: .Blood Blood  Preliminary Report (13 Dec 2021 23:02):    No growth to date.    Culture - Blood (collected 12 Dec 2021 17:01)  Source: .Blood Blood  Preliminary Report (13 Dec 2021 23:02):    No growth to date.                                                                                           MEDICATIONS  (STANDING):  buprenorphine 2 mG/naloxone 0.5 mG SL  Tablet 1 Tablet(s) SubLingual <User Schedule>  enoxaparin Injectable 40 milliGRAM(s) SubCutaneous daily  folic acid 1 milliGRAM(s) Oral daily  influenza   Vaccine 0.5 milliLiter(s) IntraMuscular once  multivitamin 1 Tablet(s) Oral daily  thiamine Injectable 100 milliGRAM(s) IV Push daily    MEDICATIONS  (PRN):  LORazepam     Tablet 2 milliGRAM(s) Oral every 2 hours PRN CIWA-Ar score increase by 2 points and a total score of 7 or less      New X-rays reviewed:                                                                                  ECHO    CXR interpreted by me:

## 2021-12-15 NOTE — DISCHARGE NOTE PROVIDER - PROVIDER RX CONTACT NUMBER
Oncology Social Work note  1/18/2021     Referral source: [employer] financial, disability concerns    Writer met with patient on this date in infusion area. Patient currently utilizing PTO to take time off of work while receiving treatment. Patient states has pending FMLA case for cancer diagnosis, is expected to receive FMLA through employer. Patient states does not want to stop working yet, because of good benefits receives through employer and salary. Patient has medicare part A with insurance through employer.     Patient does not have short term or long term disability plan through employer. Patient collects social security in addition to income from employer. Patient reports some financial strain. Writer offered assistance with applying for [employer]. Writer pre-screened patient for annual income. Patient likely over income for both programs. Patient does not desire to apply to programs at this time. Writer provided patient with Social Work contact information if have further questions, concerns, or need for assistance in the future.    ABRAN Figueroa  
[phone]

## 2021-12-15 NOTE — DISCHARGE NOTE NURSING/CASE MANAGEMENT/SOCIAL WORK - PATIENT PORTAL LINK FT
You can access the FollowMyHealth Patient Portal offered by [facility] by registering at the following website: [email or web address]. By joining [facility]’s FollowMyHealth portal, you will also be able to view your health information using other applications (apps) compatible with our system.

## 2021-12-15 NOTE — DISCHARGE NOTE NURSING/CASE MANAGEMENT/SOCIAL WORK - NSDCPEWEB_GEN_ALL_CORE
[facility] for Tobacco Control website --- [email or web address] --- [email or web address]

## 2021-12-15 NOTE — PROGRESS NOTE ADULT - SUBJECTIVE AND OBJECTIVE BOX
pt seen and examined.   pt was admitted for ams , she states she took cocaine and liquor. she was found and brought in unresponsive, today she is awake and laert and cleared by psych / addiction medicine. seen and counseled by [facility] team.   suboxone was sent to pharmacy by addicion medicine team   fu outpt     T(F): 97.2 (12-15-21 @ 11:19), Max: 97.6 (12-14-21 @ 16:00)  HR: 56 (12-15-21 @ 11:19) (53 - 75)  BP: 131/86 (12-15-21 @ 11:19) (112/57 - 138/65)  RR: 18 (12-15-21 @ 11:19) (12 - 22)  SpO2: 96% (12-15-21 @ 11:19) (96% - 100%)    Physical exam:   constitutional NAD, AAOX3, Respiratory  lungs CTA, CVS heart RRR, GI: abdomen Soft NT, ND, BS+, skin: intact  neuro exam Motor, sensory and CN normal, no deficit     a/p  # AMS, due to intoxication, now stable and AAOX3, dc home   time spent 35min

## 2021-12-15 NOTE — DISCHARGE NOTE PROVIDER - NSDCFUADDAPPT_GEN_ALL_CORE_FT
Appointment scheduled for 12/22/2021 at 12:00PM  [facility] clinic  [address] [place] [address]  1st floor  Phone number: [phone]
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
## 2021-12-15 NOTE — DISCHARGE NOTE NURSING/CASE MANAGEMENT/SOCIAL WORK - NSDCFUADDAPPT_GEN_ALL_CORE_FT
Appointment scheduled for 12/22/2021 at 12:00PM  [facility] Dual Diagnosis OPD clinic  [address] [place] [address]  1st floor  Phone number: [phone]

## 2021-12-15 NOTE — PROGRESS NOTE ADULT - ASSESSMENT
IMPRESSION:    AMS improved   ETOH / cocaine     PLAN:    CNS:  CIWA.  Thiamine and FOlate.      HEENT: Oral care    PULMONARY:  HOB @ 45 degrees.  Aspiration precautions     CARDIOVASCULAR:  Avoid volume overload     GI: GI prophylaxis.  Feeding.  Bowel regimen     RENAL:  Follow up lytes.  Correct as needed    INFECTIOUS DISEASE: Follow up cultures.  DC ABX     HEMATOLOGICAL:  DVT prophylaxis.    ENDOCRINE:  Follow up FS.      MUSCULOSKELETAL:  OOB to mateo VICTORIA Planning         
41 year old F with a PMHx of polysubstance abuse (specifically alcohol, percocet, ecstasy) presents for altered mental status    Altered mental status secondary to possible cocaine use/alcohol abuse  HAGMA   -hx of polysubstance abuse  -as per patient, used cocaine and drank excessive liquor prior to being found by employee  -anion gap of 22 on admission, resolved as AG 12 now  -blood alcohol elevated on admission  -pCO2 of 50 on VBG on admission, currently 46  -continue LR  -thiamine 100mg daily  -pending urine tox  -psych consulted because of apparent suicidal ideation---> suboxone 2/0.5 BID started, EKG ordered; as per patient, pt says she was not suicidal and doesn't know why this was reported   -addiction medicine consulted, f/u recs    Proctocolitis, questionable  -found on CT A/P; no signs of sepsis or SIRS  -was started on cefepime and vanc; abx discontinued for now    DVT ppx: lovenox
IMPRESSION:    AMS improved   ETOH / cocaine     PLAN:    CNS:  CIWA.  Thiamine and Folate.  continue suboxone.     HEENT: Oral care    PULMONARY:  HOB @ 45 degrees.  Aspiration precautions     CARDIOVASCULAR:  Avoid volume overload     GI: GI prophylaxis.  Feeding.  Bowel regimen     RENAL:  Follow up lytes.  Correct as needed    INFECTIOUS DISEASE: Follow up cultures.  monitor off abx    HEMATOLOGICAL:  DVT prophylaxis.    ENDOCRINE:  Follow up FS.      MUSCULOSKELETAL:  OOB to cahir    Social Work eval  DC Planning   downgrade to floor        
 Ms Field is a 46 year old woman with a history of Opioid and Cocaine Use disorder who was admitted to the ICU service  for altered mental status , following an overdose illicit drugs and alcohol. Patient has since been downgraded to the medical floor. Psychiatry consult was called for the evaluation of possible suicidal ideations as patient was said to have informed  ICU staff that she was having thoughts of wanting to end her life.   Addiction consult was called for opioid and cocaine use disorder .   Patient appears to have tolerated the Suboxone with no adverse effects ,. She continues to be amenable for outpatient substance use disorder treatment , and supportive psychotherapy to help her address her ability to cope with her stressors.      For now, patient does not appear to have acute symptoms of psychosis, dc or depression and continues to deny having current suicidal ideations , intent or plan.   	At this time, patient is not considered an imminent danger to herself or others and does not need inpatient psychiatric hospitalization. We recommend continuing Suboxone 2/0.5mg SL BID for opioid use disorder .The CATCH team social workers and counsellors will continue to follow while patient is in the hospital.       Diagnosis   Adjustment disorder   Cocaine Use disorder   Opioid use disorder     Plan  1. Continue Suboxone 2/0.5mg SL BID  2. Please refer to the  [facility] Dual focus  OPD, [address] [place] [address], and Phone number: [phone], appointment id tomorrow , Thursday , 12/16/21, at 12.00noon   
5

## 2021-12-15 NOTE — DISCHARGE NOTE PROVIDER - CARE PROVIDER_API CALL
Messi Brooke)  Psychiatry  [address] [place] [id number]  Phone: [phone]  Fax: [phone]  Follow Up Time: 1 week

## 2021-12-15 NOTE — DISCHARGE NOTE NURSING/CASE MANAGEMENT/SOCIAL WORK - NSTRANSFERBELONGINGSRESP_GEN_A_NUR
----- Message from Bernard Breen DO sent at 12/6/2018  8:02 AM CST -----  Diabetes worsened, call weekly with blood sugars, follow up in 3 months.   yes

## 2021-12-15 NOTE — PROGRESS NOTE ADULT - SUBJECTIVE AND OBJECTIVE BOX
Chief Complaint  " I want to go home, its my son's birthday"     History of present illness   Patient seen and interviewed .    Mental Status Examination       Vitals   Vital Signs Last 24 Hrs  T(C): 36.2 (15 Dec 2021 08:00), Max: 36.7 (14 Dec 2021 12:00)  T(F): 97.1 (15 Dec 2021 08:00), Max: 98.1 (14 Dec 2021 12:00)  HR: 75 (15 Dec 2021 08:00) (53 - 75)  BP: 134/63 (15 Dec 2021 08:00) (112/57 - 156/72)  BP(mean): 91 (15 Dec 2021 08:00) (82 - 103)  RR: 18 (15 Dec 2021 08:00) (12 - 22)  SpO2: 96% (15 Dec 2021 08:00) (96% - 100%)    Labs                         10.6   6.42  )-----------( 264      ( 15 Dec 2021 06:00 )             33.4       12-15    139  |  107  |  9<L>  ----------------------------<  93  4.0   |  21  |  0.7    Ca    8.4<L>      15 Dec 2021 06:00  Mg     1.7     12-15    TPro  5.7<L>  /  Alb  3.2<L>  /  TBili  0.4  /  DBili  x   /  AST  14  /  ALT  24  /  AlkPhos  56  12-15      EKG   QTC Calculation(Bazett) 517 ms  12/12/21     Chief Complaint  " I want to go home, its my son's birthday"     History of present illness   Patient seen and interviewed .  On approach , patient was observed tyo be on her cell phone . She states " that's my boyfriend he can wait".    She reports sleeping well and feeling better than yesterday. Patient reports that she wants to leave since its her son's birthday. Patient reports that she tolerated the Suboxone with no side effects. She reports that she continues to be interested in following up with a therapist and psychiatrist on outpatient basis . patient states ' i don't want to be a drug user, i dont want my kids to know me as that kind of mother , i will do it for my kids."  Patient denies having acute symptoms of depression , dc  or psychosis . She denies having current suicidal thoughts , intent or plan.     Mental Status Examination   Patient is awake and alert, well oriented in time, place and person, speech is normal in rate, volume, quality and quantity. Mood is described as "okay ", Affect is full ranged , Thought process is linear, goal oriented, Thought content - denies suicidal ideations, intent ort plan. Perceptions - denies auditory and visual hallucinations, insight and judgment is fair.      Vitals   Vital Signs Last 24 Hrs  T(C): 36.2 (15 Dec 2021 08:00), Max: 36.7 (14 Dec 2021 12:00)  T(F): 97.1 (15 Dec 2021 08:00), Max: 98.1 (14 Dec 2021 12:00)  HR: 75 (15 Dec 2021 08:00) (53 - 75)  BP: 134/63 (15 Dec 2021 08:00) (112/57 - 156/72)  BP(mean): 91 (15 Dec 2021 08:00) (82 - 103)  RR: 18 (15 Dec 2021 08:00) (12 - 22)  SpO2: 96% (15 Dec 2021 08:00) (96% - 100%)    Labs                         10.6   6.42  )-----------( 264      ( 15 Dec 2021 06:00 )             33.4       12-15    139  |  107  |  9<L>  ----------------------------<  93  4.0   |  21  |  0.7    Ca    8.4<L>      15 Dec 2021 06:00  Mg     1.7     12-15    TPro  5.7<L>  /  Alb  3.2<L>  /  TBili  0.4  /  DBili  x   /  AST  14  /  ALT  24  /  AlkPhos  56  12-15      EKG   QTC Calculation(Bazett) 517 ms  12/12/21

## 2021-12-16 ENCOUNTER — APPOINTMENT (OUTPATIENT)
Dept: PSYCHIATRY | Facility: CLINIC | Age: 46
End: 2021-12-16

## 2021-12-16 LAB
ETHYLENE GLYCOL SERPLBLD-MCNC: <5 MG/DL — SIGNIFICANT CHANGE UP
METHANOL BLD-MCNC: <.01 G/DL — SIGNIFICANT CHANGE UP (ref 0–0.01)

## 2021-12-17 LAB
CULTURE RESULTS: SIGNIFICANT CHANGE UP
CULTURE RESULTS: SIGNIFICANT CHANGE UP
SPECIMEN SOURCE: SIGNIFICANT CHANGE UP
SPECIMEN SOURCE: SIGNIFICANT CHANGE UP
VIT B1 SERPL-MCNC: 246.9 NMOL/L — HIGH (ref 66.5–200)

## 2021-12-19 LAB
DRUG SCREEN, SERUM: ABNORMAL
PYRIDOXAL PHOS SERPL-MCNC: 5.6 UG/L — SIGNIFICANT CHANGE UP (ref 2–32.8)

## 2021-12-20 DIAGNOSIS — E16.2 HYPOGLYCEMIA, UNSPECIFIED: ICD-10-CM

## 2021-12-20 DIAGNOSIS — G92.8 OTHER TOXIC ENCEPHALOPATHY: ICD-10-CM

## 2021-12-20 DIAGNOSIS — T51.91XA TOXIC EFFECT OF UNSPECIFIED ALCOHOL, ACCIDENTAL (UNINTENTIONAL), INITIAL ENCOUNTER: ICD-10-CM

## 2021-12-20 DIAGNOSIS — F43.20 ADJUSTMENT DISORDER, UNSPECIFIED: ICD-10-CM

## 2021-12-20 DIAGNOSIS — E87.2 ACIDOSIS: ICD-10-CM

## 2021-12-20 DIAGNOSIS — G93.0 CEREBRAL CYSTS: ICD-10-CM

## 2021-12-20 DIAGNOSIS — R41.82 ALTERED MENTAL STATUS, UNSPECIFIED: ICD-10-CM

## 2021-12-20 DIAGNOSIS — E51.9 THIAMINE DEFICIENCY, UNSPECIFIED: ICD-10-CM

## 2021-12-20 DIAGNOSIS — K51.30 ULCERATIVE (CHRONIC) RECTOSIGMOIDITIS WITHOUT COMPLICATIONS: ICD-10-CM

## 2021-12-20 DIAGNOSIS — T40.5X1A POISONING BY COCAINE, ACCIDENTAL (UNINTENTIONAL), INITIAL ENCOUNTER: ICD-10-CM

## 2021-12-20 DIAGNOSIS — R68.0 HYPOTHERMIA, NOT ASSOCIATED WITH LOW ENVIRONMENTAL TEMPERATURE: ICD-10-CM

## 2021-12-29 LAB — METHADONE BLD-MCNC: <5 NG/ML — LOW (ref 100–400)

## 2022-01-04 PROBLEM — Z78.9 OTHER SPECIFIED HEALTH STATUS: Chronic | Status: ACTIVE | Noted: 2021-12-12

## 2022-01-05 ENCOUNTER — APPOINTMENT (OUTPATIENT)
Dept: PSYCHIATRY | Facility: CLINIC | Age: 47
End: 2022-01-05

## 2022-01-05 ENCOUNTER — OUTPATIENT (OUTPATIENT)
Dept: OUTPATIENT SERVICES | Facility: HOSPITAL | Age: 47
LOS: 1 days | Discharge: HOME | End: 2022-01-05
Payer: MEDICAID

## 2022-01-05 DIAGNOSIS — F11.20 OPIOID DEPENDENCE, UNCOMPLICATED: ICD-10-CM

## 2022-01-05 PROCEDURE — 99214 OFFICE O/P EST MOD 30 MIN: CPT

## 2022-01-10 ENCOUNTER — APPOINTMENT (OUTPATIENT)
Dept: PSYCHIATRY | Facility: CLINIC | Age: 47
End: 2022-01-10

## 2022-01-11 ENCOUNTER — APPOINTMENT (OUTPATIENT)
Dept: PSYCHIATRY | Facility: CLINIC | Age: 47
End: 2022-01-11

## 2022-03-01 LAB — OPIATES UR QL: NEGATIVE — SIGNIFICANT CHANGE UP

## 2022-05-02 NOTE — DISCHARGE NOTE NURSING/CASE MANAGEMENT/SOCIAL WORK - NSDCPEFALRISK_GEN_ALL_CORE
For information on Fall & Injury Prevention, visit: [email or web address] OR  [email or web address] OR  https://www.cdc.gov/steadi/patient.html
No

## 2022-12-10 ENCOUNTER — EMERGENCY (EMERGENCY)
Facility: HOSPITAL | Age: 47
LOS: 0 days | Discharge: HOME | End: 2022-12-10
Attending: STUDENT IN AN ORGANIZED HEALTH CARE EDUCATION/TRAINING PROGRAM | Admitting: STUDENT IN AN ORGANIZED HEALTH CARE EDUCATION/TRAINING PROGRAM

## 2022-12-10 VITALS
HEART RATE: 58 BPM | SYSTOLIC BLOOD PRESSURE: 124 MMHG | DIASTOLIC BLOOD PRESSURE: 69 MMHG | TEMPERATURE: 98 F | RESPIRATION RATE: 18 BRPM | OXYGEN SATURATION: 98 %

## 2022-12-10 DIAGNOSIS — F11.23 OPIOID DEPENDENCE WITH WITHDRAWAL: ICD-10-CM

## 2022-12-10 DIAGNOSIS — R11.2 NAUSEA WITH VOMITING, UNSPECIFIED: ICD-10-CM

## 2022-12-10 DIAGNOSIS — R53.1 WEAKNESS: ICD-10-CM

## 2022-12-10 DIAGNOSIS — Z87.891 PERSONAL HISTORY OF NICOTINE DEPENDENCE: ICD-10-CM

## 2022-12-10 LAB
ALBUMIN SERPL ELPH-MCNC: 4.2 G/DL — SIGNIFICANT CHANGE UP (ref 3.5–5.2)
ALP SERPL-CCNC: 59 U/L — SIGNIFICANT CHANGE UP (ref 30–115)
ALT FLD-CCNC: 8 U/L — SIGNIFICANT CHANGE UP (ref 0–41)
ANION GAP SERPL CALC-SCNC: 9 MMOL/L — SIGNIFICANT CHANGE UP (ref 7–14)
APAP SERPL-MCNC: <5 UG/ML — LOW (ref 10–30)
AST SERPL-CCNC: 12 U/L — SIGNIFICANT CHANGE UP (ref 0–41)
BASOPHILS # BLD AUTO: 0.05 K/UL — SIGNIFICANT CHANGE UP (ref 0–0.2)
BASOPHILS NFR BLD AUTO: 0.8 % — SIGNIFICANT CHANGE UP (ref 0–1)
BILIRUB SERPL-MCNC: 0.5 MG/DL — SIGNIFICANT CHANGE UP (ref 0.2–1.2)
BUN SERPL-MCNC: 8 MG/DL — LOW (ref 10–20)
CALCIUM SERPL-MCNC: 9 MG/DL — SIGNIFICANT CHANGE UP (ref 8.4–10.5)
CHLORIDE SERPL-SCNC: 104 MMOL/L — SIGNIFICANT CHANGE UP (ref 98–110)
CO2 SERPL-SCNC: 25 MMOL/L — SIGNIFICANT CHANGE UP (ref 17–32)
CREAT SERPL-MCNC: 0.7 MG/DL — SIGNIFICANT CHANGE UP (ref 0.7–1.5)
EGFR: 108 ML/MIN/1.73M2 — SIGNIFICANT CHANGE UP
EOSINOPHIL # BLD AUTO: 0.11 K/UL — SIGNIFICANT CHANGE UP (ref 0–0.7)
EOSINOPHIL NFR BLD AUTO: 1.7 % — SIGNIFICANT CHANGE UP (ref 0–8)
ETHANOL SERPL-MCNC: <10 MG/DL — SIGNIFICANT CHANGE UP
GLUCOSE SERPL-MCNC: 92 MG/DL — SIGNIFICANT CHANGE UP (ref 70–99)
HCT VFR BLD CALC: 37.1 % — SIGNIFICANT CHANGE UP (ref 37–47)
HGB BLD-MCNC: 12.1 G/DL — SIGNIFICANT CHANGE UP (ref 12–16)
IMM GRANULOCYTES NFR BLD AUTO: 0.3 % — SIGNIFICANT CHANGE UP (ref 0.1–0.3)
LYMPHOCYTES # BLD AUTO: 1.21 K/UL — SIGNIFICANT CHANGE UP (ref 1.2–3.4)
LYMPHOCYTES # BLD AUTO: 18.3 % — LOW (ref 20.5–51.1)
MCHC RBC-ENTMCNC: 31.5 PG — HIGH (ref 27–31)
MCHC RBC-ENTMCNC: 32.6 G/DL — SIGNIFICANT CHANGE UP (ref 32–37)
MCV RBC AUTO: 96.6 FL — SIGNIFICANT CHANGE UP (ref 81–99)
MONOCYTES # BLD AUTO: 0.28 K/UL — SIGNIFICANT CHANGE UP (ref 0.1–0.6)
MONOCYTES NFR BLD AUTO: 4.2 % — SIGNIFICANT CHANGE UP (ref 1.7–9.3)
NEUTROPHILS # BLD AUTO: 4.93 K/UL — SIGNIFICANT CHANGE UP (ref 1.4–6.5)
NEUTROPHILS NFR BLD AUTO: 74.7 % — SIGNIFICANT CHANGE UP (ref 42.2–75.2)
NRBC # BLD: 0 /100 WBCS — SIGNIFICANT CHANGE UP (ref 0–0)
PLATELET # BLD AUTO: 281 K/UL — SIGNIFICANT CHANGE UP (ref 130–400)
POTASSIUM SERPL-MCNC: 5.1 MMOL/L — HIGH (ref 3.5–5)
POTASSIUM SERPL-SCNC: 5.1 MMOL/L — HIGH (ref 3.5–5)
PROT SERPL-MCNC: 7.2 G/DL — SIGNIFICANT CHANGE UP (ref 6–8)
RBC # BLD: 3.84 M/UL — LOW (ref 4.2–5.4)
RBC # FLD: 13.5 % — SIGNIFICANT CHANGE UP (ref 11.5–14.5)
SALICYLATES SERPL-MCNC: <0.3 MG/DL — LOW (ref 4–30)
SODIUM SERPL-SCNC: 138 MMOL/L — SIGNIFICANT CHANGE UP (ref 135–146)
WBC # BLD: 6.6 K/UL — SIGNIFICANT CHANGE UP (ref 4.8–10.8)
WBC # FLD AUTO: 6.6 K/UL — SIGNIFICANT CHANGE UP (ref 4.8–10.8)

## 2022-12-10 PROCEDURE — 99284 EMERGENCY DEPT VISIT MOD MDM: CPT

## 2022-12-10 RX ORDER — ONDANSETRON 8 MG/1
4 TABLET, FILM COATED ORAL ONCE
Refills: 0 | Status: COMPLETED | OUTPATIENT
Start: 2022-12-10 | End: 2022-12-10

## 2022-12-10 RX ORDER — BUPRENORPHINE AND NALOXONE 2; .5 MG/1; MG/1
1 TABLET SUBLINGUAL ONCE
Refills: 0 | Status: DISCONTINUED | OUTPATIENT
Start: 2022-12-10 | End: 2022-12-10

## 2022-12-10 RX ORDER — SODIUM CHLORIDE 9 MG/ML
1000 INJECTION, SOLUTION INTRAVENOUS ONCE
Refills: 0 | Status: COMPLETED | OUTPATIENT
Start: 2022-12-10 | End: 2022-12-10

## 2022-12-10 RX ADMIN — ONDANSETRON 4 MILLIGRAM(S): 8 TABLET, FILM COATED ORAL at 14:26

## 2022-12-10 RX ADMIN — SODIUM CHLORIDE 1000 MILLILITER(S): 9 INJECTION, SOLUTION INTRAVENOUS at 14:27

## 2022-12-10 RX ADMIN — BUPRENORPHINE AND NALOXONE 1 TABLET(S): 2; .5 TABLET SUBLINGUAL at 14:11

## 2022-12-10 NOTE — ED PROVIDER NOTE - NSFOLLOWUPINSTRUCTIONS_ED_ALL_ED_FT
Please Call detox Center [facility] tomorrow, as early as 9am. They provide transportation to their facility.     Return to the ER if you have worsening symptoms.

## 2022-12-10 NOTE — ED PROVIDER NOTE - NS ED ROS FT
Constitutional: See HPI.  Cardiac: No SOB or edema. No chest pain with exertion.  Respiratory: No cough or respiratory distress. No hemoptysis. No history of asthma or RAD.  GI: + vomiting, No diarrhea or abdominal pain.  : No dysuria, frequency or burning.   MS: + gen weakness, myalgia, No  muscle weakness  Neuro: No headache or focal weakness. No LOC.  Skin: No skin rash.  Except as documented in the HPI, all other systems are negative.

## 2022-12-10 NOTE — ED ADULT TRIAGE NOTE - CHIEF COMPLAINT QUOTE
Patient a+ox3 co withdrawal symptoms from Suboxone reports feeling "shaky", last dose yesterday. Pt states unable to go to rehab for prescription dt transportation and requesting refill.

## 2022-12-10 NOTE — ED PROVIDER NOTE - OBJECTIVE STATEMENT
47F with PMH polysubstance abuse, including suboxone (reports getting it off the street), h/o snorting it, ecstacy, etoh, remote h/o percocet abuse who presents to [facility] for opioid withdrawal. Reports nausea, the jitters, and generalized weakness, irritability. Also vomiting nbnb.

## 2022-12-10 NOTE — ED PROVIDER NOTE - PATIENT PORTAL LINK FT
You can access the FollowMyHealth Patient Portal offered by [facility] by registering at the following website: [email or web address]. By joining [employer]’s FollowMyHealth portal, you will also be able to view your health information using other applications (apps) compatible with our system.

## 2022-12-10 NOTE — ED PROVIDER NOTE - NSFOLLOWUPCLINICS_GEN_ALL_ED_FT
[facility]  Detox Mgmt  [address] [place] [address]  Phone: [phone]  Fax:   Follow Up Time: 1-3 Days

## 2022-12-10 NOTE — ED PROVIDER NOTE - CLINICAL SUMMARY MEDICAL DECISION MAKING FREE TEXT BOX
47F with PMH polysubstance abuse, including suboxone (reports getting it off the street), h/o snorting it, ecstacy, etoh, remote h/o percocet abuse who presents to [facility] for opioid withdrawal. COWS 9 on arrival, suboxone given, and on reassessment COWS 3. Pt offered f/u with detox, [facility] Detox program,  Patient given return precautions, f/u instructions and verbalizes understanding.

## 2023-01-26 NOTE — ED PROVIDER NOTE - OBJECTIVE STATEMENT
Patient is a 46 yo F w/ hx of oral herpes, dentures w/ breakouts on lips usually p/w mouth sores. Patient states she has had salty taste in mouth x 2-3 days associated with a few sores to her gums. No fevers, no throat swelling. Patient took valtrex and penicillin; also using listerine and peroxide to wash her mouth. 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 Denies vaginal lesions.
Other

## 2023-04-11 ENCOUNTER — APPOINTMENT (OUTPATIENT)
Dept: PSYCHIATRY | Facility: CLINIC | Age: 48
End: 2023-04-11
Payer: MEDICAID

## 2023-04-11 ENCOUNTER — OUTPATIENT (OUTPATIENT)
Dept: OUTPATIENT SERVICES | Facility: HOSPITAL | Age: 48
LOS: 1 days | End: 2023-04-11
Payer: MEDICAID

## 2023-04-11 DIAGNOSIS — F11.20 OPIOID DEPENDENCE, UNCOMPLICATED: ICD-10-CM

## 2023-04-11 DIAGNOSIS — F10.20 ALCOHOL DEPENDENCE, UNCOMPLICATED: ICD-10-CM

## 2023-04-11 PROCEDURE — 99214 OFFICE O/P EST MOD 30 MIN: CPT

## 2023-04-11 PROCEDURE — 90791 PSYCH DIAGNOSTIC EVALUATION: CPT | Mod: 59

## 2023-04-12 NOTE — BEHAVIORAL HEALTH ASSESSMENT NOTE - SUBSTANCE USE
April 12, 2023      [facility]  [address]  [facility] [address]  Phone: [phone]  Fax: [phone]       Patient: Zabrina Su   YOB: 2012  Date of Visit: 04/12/2023    To Whom It May Concern:    Zabrina Su  was at [facility] on 04/12/2023. The patient may return to work/school on 4/14/2023 with no restrictions. If you have any questions or concerns, or if I can be of further assistance, please do not hesitate to contact me.    Sincerely,    Linnette Barbour, MONIQUE-BC            
Yes

## 2023-04-14 ENCOUNTER — APPOINTMENT (OUTPATIENT)
Dept: PSYCHIATRY | Facility: CLINIC | Age: 48
End: 2023-04-14

## 2023-04-17 NOTE — ED PROVIDER NOTE - DISCHARGE REVIEW MATERIAL PRESENTED
20  Supine flexion 2# x 30  MANUAL THERAPY: (0 minutes):   Joint mobilization, Soft tissue mobilization, and Manipulation was utilized and necessary because of the patient's restricted joint motion, painful spasm, and restricted motion of soft tissue. Treatment/Session Summary:    Treatment Assessment:  Pt able to complete exercises with increased reps and resistance. Progressing well overall and has 2 more visits until discharge  Communication/Consultation:  None today  Equipment provided today:  na  Recommendations/Intent for next treatment session: Next visit will focus on ROM, strengthening.     Total Treatment Billable Duration:  40 minutes  Time In: 0930  Time Out: [place] PT       Charge Capture  }Post Session Pain  PT Visit Info  [facility] Portal  MD Guidelines  Scanned Media  Benefits  MyChart    Future Appointments   Date Time Provider Sheree Hernández   4/18/2023 12:10 PM [facility] OUTREACH INSURANCE GIULIANO Magda Blue [facility]   4/18/2023 12:30 PM RUBEN Mitchell - ARY [facility] AMB   4/24/2023 11:00 AM Deepti Hargrove PT SFOST [facility]   5/15/2023  1:00 PM Kirk Briggs MD [facility] AMB   7/3/2023  8:00 AM [facility] LAB DS SFEDS SFE   7/10/2023  8:20 AM Lokesh Silveira MD MAT [facility] AMB   7/17/2023  9:10 AM [facility]   7/17/2023  9:45 AM Felice Mata MD [facility] AMB
.

## 2023-05-04 NOTE — ED ADULT NURSE NOTE - NSFALLRSKUNASSIST_ED_ALL_ED
Immunization chart prep completed.  Immunization records verified.  Marnie Leal due for Covid and Hep A   yes

## 2023-08-15 NOTE — ED ADULT TRIAGE NOTE - TEMPERATURE IN CELSIUS (DEGREES C)
MONITOR SUMMARY:     Rhythm: 1st degree HB, SR  Rate: 72 - 83  Ectopy: none  Measurements: .27/.08/.48             36.9

## 2023-12-29 ENCOUNTER — EMERGENCY (EMERGENCY)
Facility: HOSPITAL | Age: 48
LOS: 0 days | Discharge: HOME | End: 2023-12-29
Attending: STUDENT IN AN ORGANIZED HEALTH CARE EDUCATION/TRAINING PROGRAM
Payer: MEDICAID

## 2023-12-29 VITALS
WEIGHT: 169.98 LBS | HEART RATE: 61 BPM | RESPIRATION RATE: 18 BRPM | SYSTOLIC BLOOD PRESSURE: 146 MMHG | TEMPERATURE: 97 F | DIASTOLIC BLOOD PRESSURE: 91 MMHG | OXYGEN SATURATION: 97 %

## 2023-12-29 DIAGNOSIS — M54.2 CERVICALGIA: ICD-10-CM
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
## 2023-12-29 DIAGNOSIS — M54.6 PAIN IN THORACIC SPINE: ICD-10-CM
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
## 2023-12-29 DIAGNOSIS — M25.532 PAIN IN LEFT WRIST: ICD-10-CM

## 2023-12-29 PROCEDURE — 71045 X-RAY EXAM CHEST 1 VIEW: CPT | Mod: 26

## 2023-12-29 PROCEDURE — 99284 EMERGENCY DEPT VISIT MOD MDM: CPT

## 2023-12-29 PROCEDURE — 73110 X-RAY EXAM OF WRIST: CPT | Mod: 26,LT

## 2023-12-29 PROCEDURE — 99284 EMERGENCY DEPT VISIT MOD MDM: CPT | Mod: 25

## 2023-12-29 PROCEDURE — 71045 X-RAY EXAM CHEST 1 VIEW: CPT

## 2023-12-29 PROCEDURE — 73110 X-RAY EXAM OF WRIST: CPT | Mod: LT

## 2023-12-29 RX ORDER — METHOCARBAMOL 500 MG/1
1 TABLET, FILM COATED ORAL
Qty: 12 | Refills: 0
Start: 2023-12-29 | End: 2023-12-31

## 2023-12-29 RX ORDER — ACETAMINOPHEN 500 MG
975 TABLET ORAL ONCE
Refills: 0 | Status: COMPLETED | OUTPATIENT
Start: 2023-12-29 | End: 2023-12-29

## 2023-12-29 RX ORDER — METHOCARBAMOL 500 MG/1
750 TABLET, FILM COATED ORAL ONCE
Refills: 0 | Status: COMPLETED | OUTPATIENT
Start: 2023-12-29 | End: 2023-12-29

## 2023-12-29 RX ADMIN — Medication 975 MILLIGRAM(S): at 18:19

## 2023-12-29 RX ADMIN — METHOCARBAMOL 750 MILLIGRAM(S): 500 TABLET, FILM COATED ORAL at 18:55

## 2023-12-29 NOTE — ED PROVIDER NOTE - NSFOLLOWUPCLINICS_GEN_ALL_ED_FT
[facility]  Orthpedic  [address] [place], [place]   Phone: [phone]  Fax:      [facility]  Orthpedic  [address] [place], [place]   Phone: [phone]  Fax:

## 2023-12-29 NOTE — ED PROVIDER NOTE - PATIENT PORTAL LINK FT
You can access the FollowMyHealth Patient Portal offered by [facility] by registering at the following website: [email or web address]. By joining [employer]’s FollowMyHealth portal, you will also be able to view your health information using other applications (apps) compatible with our system. You can access the FollowMyHealth Patient Portal offered by [facility] by registering at the following website: [email or web address]. By joining [employer]’s FollowMyHealth portal, you will also be able to view your health information using other applications (apps) compatible with our system.

## 2023-12-29 NOTE — ED ADULT NURSE NOTE - NSFALLUNIVINTERV_ED_ALL_ED
Bed/Stretcher in lowest position, wheels locked, appropriate side rails in place/Call bell, personal items and telephone in reach/Instruct patient to call for assistance before getting out of bed/chair/stretcher/Non-slip footwear applied when patient is off stretcher/[place] to call system/Physically safe environment - no spills, clutter or unnecessary equipment/Purposeful proactive rounding/Room/bathroom lighting operational, light cord in reach Bed/Stretcher in lowest position, wheels locked, appropriate side rails in place/Call bell, personal items and telephone in reach/Instruct patient to call for assistance before getting out of bed/chair/stretcher/Non-slip footwear applied when patient is off stretcher/[place] to call system/Physically safe environment - no spills, clutter or unnecessary equipment/Purposeful proactive rounding/Room/bathroom lighting operational, light cord in reach

## 2023-12-29 NOTE — ED PROVIDER NOTE - NSFOLLOWUPINSTRUCTIONS_ED_ALL_ED_FT
Our Emergency Department Referral Coordinators will be reaching out to you in the next 24-48 hours from 9:00am to 5:00pm with a follow up appointment. Please expect a phone call from the hospital in that time frame. If you do not receive a call or if you have any questions or concerns, you can reach them at   [phone]     Shoulder Pain    WHAT YOU NEED TO KNOW:    Shoulder pain is a common problem that can affect your daily activities. Pain can be caused by a problem within your shoulder, such as soreness of a tendon or bursa. A tendon is a cord of tough tissue that connects your muscles to your bones. The bursa is a fluid-filled sac that acts as a cushion between a bone and a tendon. Shoulder pain may also be caused by pain that spreads to your shoulder from another part of your body.Shoulder Anatomy    Return to the emergency department if:     You have severe pain.    You cannot move your arm or shoulder.    You have numbness or tingling in your shoulder or arm.    Contact your healthcare provider if:     Your pain gets worse or does not go away with treatment.    You have trouble moving your arm or shoulder.    You have questions or concerns about your condition or care.    Medicines: You may need any of the following:     Acetaminophen decreases pain and fever. It is available without a doctor's order. Ask how much to take and how often to take it. Follow directions. Read the labels of all other medicines you are using to see if they also contain acetaminophen, or ask your doctor or pharmacist. Acetaminophen can cause liver damage if not taken correctly. Do not use more than 4 grams (4,000 milligrams) total of acetaminophen in one day.     NSAIDs, such as ibuprofen, help decrease swelling, pain, and fever. This medicine is available with or without a doctor's order. NSAIDs can cause stomach bleeding or kidney problems in certain people. If you take blood thinner medicine, always ask your healthcare provider if NSAIDs are safe for you. Always read the medicine label and follow directions.    Take your medicine as directed. Contact your healthcare provider if you think your medicine is not helping or if you have side effects. Tell him of her if you are allergic to any medicine. Keep a list of the medicines, vitamins, and herbs you take. Include the amounts, and when and why you take them. Bring the list or the pill bottles to follow-up visits. Carry your medicine list with you in case of an emergency.    Manage your symptoms:     Apply ice on your shoulder for 20 to 30 minutes every 2 hours or as directed. Use an ice pack, or put crushed ice in a plastic bag. Cover it with a towel before you apply it to your shoulder. Ice helps prevent tissue damage and decreases swelling and pain.    Apply heat if ice does not help your symptoms. Apply heat on your shoulder for 20 to 30 minutes every 2 hours for as many days as directed. Heat helps decrease pain and muscle spasms.    Limit activities as directed. Try to avoid repeated overhead movements.    Go to physical or occupational therapy as directed. A physical therapist teaches you exercises to help improve movement and strength, and to decrease pain. An occupational therapist teaches you skills to help with your daily activities.     Prevent shoulder pain:     Maintain a good range of motion in your shoulder. Ask your healthcare provider which exercises you should do on a regular basis after you have healed.     Stretch and strengthen your shoulder. Use proper technique during exercises and sports.    Follow up with your healthcare provider or orthopedist as directed: Write down your questions so you remember to ask them during your visits. Our Emergency Department Referral Coordinators will be reaching out to you in the next 24-48 hours from 9:00am to 5:00pm with a follow up appointment. Please expect a phone call from the hospital in that time frame. If you do not receive a call or if you have any questions or concerns, you can reach them at   [phone]     Shoulder Pain    WHAT YOU NEED TO KNOW:    Shoulder pain is a common problem that can affect your daily activities. Pain can be caused by a problem within your shoulder, such as soreness of a tendon or bursa. A tendon is a cord of tough tissue that connects your muscles to your bones. The bursa is a fluid-filled sac that acts as a cushion between a bone and a tendon. Shoulder pain may also be caused by pain that spreads to your shoulder from another part of your body.Shoulder Anatomy    Return to the emergency department if:     You have severe pain.    You cannot move your arm or shoulder.    You have numbness or tingling in your shoulder or arm.    Contact your healthcare provider if:     Your pain gets worse or does not go away with treatment.    You have trouble moving your arm or shoulder.    You have questions or concerns about your condition or care.    Medicines: You may need any of the following:     Acetaminophen decreases pain and fever. It is available without a doctor's order. Ask how much to take and how often to take it. Follow directions. Read the labels of all other medicines you are using to see if they also contain acetaminophen, or ask your doctor or pharmacist. Acetaminophen can cause liver damage if not taken correctly. Do not use more than 4 grams (4,000 milligrams) total of acetaminophen in one day.     NSAIDs, such as ibuprofen, help decrease swelling, pain, and fever. This medicine is available with or without a doctor's order. NSAIDs can cause stomach bleeding or kidney problems in certain people. If you take blood thinner medicine, always ask your healthcare provider if NSAIDs are safe for you. Always read the medicine label and follow directions.    Take your medicine as directed. Contact your healthcare provider if you think your medicine is not helping or if you have side effects. Tell him of her if you are allergic to any medicine. Keep a list of the medicines, vitamins, and herbs you take. Include the amounts, and when and why you take them. Bring the list or the pill bottles to follow-up visits. Carry your medicine list with you in case of an emergency.    Manage your symptoms:     Apply ice on your shoulder for 20 to 30 minutes every 2 hours or as directed. Use an ice pack, or put crushed ice in a plastic bag. Cover it with a towel before you apply it to your shoulder. Ice helps prevent tissue damage and decreases swelling and pain.    Apply heat if ice does not help your symptoms. Apply heat on your shoulder for 20 to 30 minutes every 2 hours for as many days as directed. Heat helps decrease pain and muscle spasms.    Limit activities as directed. Try to avoid repeated overhead movements.    Go to physical or occupational therapy as directed. A physical therapist teaches you exercises to help improve movement and strength, and to decrease pain. An occupational therapist teaches you skills to help with your daily activities.     Prevent shoulder pain:     Maintain a good range of motion in your shoulder. Ask your healthcare provider which exercises you should do on a regular basis after you have healed.     Stretch and strengthen your shoulder. Use proper technique during exercises and sports.    Follow up with your healthcare provider or orthopedist as directed: Write down your questions so you remember to ask them during your visits.

## 2023-12-29 NOTE — ED PROVIDER NOTE - PHYSICAL EXAMINATION
CONSTITUTIONAL: well-appearing, in NAD  SKIN: Warm dry, normal skin turgor  HEAD: NCAT  EYES: EOMI, PERRLA, no scleral icterus, conjunctiva pink  ENT: normal pharynx with no erythema or exudates  NECK: Supple; Full ROM. nontender   CARD: RRR,   RESP: clear to ausculation b/l. No crackles or wheezing.  ABD: soft, non-tender, non-distended, no rebound or guarding.  BACK: left sided paraspinal tenderness in upper thoracic spine. No midline tenderness   EXT: Full ROM, no bony tenderness, no pedal edema, no calf tenderness  NEURO: normal motor. normal sensory. Normal gait.  PSYCH: Cooperative, appropriate.

## 2023-12-29 NOTE — ED PROVIDER NOTE - CLINICAL SUMMARY MEDICAL DECISION MAKING FREE TEXT BOX
48-year-old female, no pertinent past medical history, presenting for evaluation after a fall from standing.  She states that she had a leak in her ceiling and her ceiling collapsed and she jumped out of the way and landed on her left hand outstretched behind her and then onto her back.  Complains of left wrist pain that is improving on its own and left upper back pain.  Denies head trauma, seizures, nausea, vomiting, dizziness, chest pain, shortness of breath, abdominal pain.  Well-appearing on exam.  Wrist nontender to palpation and with full range of motion.  No scaphoid tenderness with axial loading or with palpation.  Full range of motion of left shoulder.  Tenderness to left trapezius with palpation.  No midline tenderness.  Mild tenderness to left paraspinal thoracic back.  Full range of motion of back.  Medications given and effects reassessed.  Imaging ordered and reviewed.  Discussed results with patient.  Given return precautions and follow-up outpatient.  Medication sent to pharmacy patient comfortable with plan.

## 2023-12-29 NOTE — ED PROVIDER NOTE - OBJECTIVE STATEMENT
Patient is a 48-year-old female with no past medical history presenting to the ED complaining of wrist pain.  Patient states 20 minutes prior to arrival patient was in her kitchen when her ceiling fell down on top of her due to a leak and as she was trying to dodge it she fell back on her left wrist and back.  Currently patient is complaining of left wrist pain and left-sided neck pain.  Patient has not taken anything for pain yet.  No other complaints at this time.  No head trauma or LOC.    Denies nausea, vomiting, fevers, chills, weakness, paresthesias, numbness, tingling, lightheadedness, headache

## 2024-02-07 NOTE — ED ADULT NURSE NOTE - NS ED NOTE ABUSE RESPONSE YN
danger to self; mental illness expected to respond to inpatient care danger to self; mental illness expected to respond to inpatient care danger to self; mental illness expected to respond to inpatient care Yes danger to self; mental illness expected to respond to inpatient care danger to self; mental illness expected to respond to inpatient care danger to self; mental illness expected to respond to inpatient care danger to self; mental illness expected to respond to inpatient care danger to self; mental illness expected to respond to inpatient care danger to self; mental illness expected to respond to inpatient care danger to self; mental illness expected to respond to inpatient care danger to self; mental illness expected to respond to inpatient care danger to self; mental illness expected to respond to inpatient care danger to self; mental illness expected to respond to inpatient care danger to self; mental illness expected to respond to inpatient care danger to self; mental illness expected to respond to inpatient care danger to self; mental illness expected to respond to inpatient care danger to self; mental illness expected to respond to inpatient care danger to self; mental illness expected to respond to inpatient care

## 2024-08-14 NOTE — BEHAVIORAL HEALTH ASSESSMENT NOTE - NS ED BHA AXIS I SECONDARY2 CODE FT
Patient was seen yesterday in office...called this AM, stated that she took a total of two suppositories, one yesterday evening, had a bout of diarrhea, then took another suppository at 1 AM, then had another bout of diarrhea. Stated that she still has rectal swelling and feels worse, not getting better.    Please advise of further treatment/recommendations.    F11.90
